# Patient Record
Sex: FEMALE | Race: WHITE | NOT HISPANIC OR LATINO | Employment: OTHER | URBAN - METROPOLITAN AREA
[De-identification: names, ages, dates, MRNs, and addresses within clinical notes are randomized per-mention and may not be internally consistent; named-entity substitution may affect disease eponyms.]

---

## 2017-12-28 ENCOUNTER — APPOINTMENT (OUTPATIENT)
Dept: GENERAL RADIOLOGY | Facility: HOSPITAL | Age: 77
End: 2017-12-28

## 2017-12-28 PROCEDURE — 73130 X-RAY EXAM OF HAND: CPT | Performed by: FAMILY MEDICINE

## 2018-01-02 DIAGNOSIS — S61.451A DOG BITE OF RIGHT HAND, INITIAL ENCOUNTER: ICD-10-CM
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
## 2018-01-02 RX ORDER — AMOXICILLIN AND CLAVULANATE POTASSIUM 875; 125 MG/1; MG/1
TABLET, FILM COATED ORAL
Qty: 10 TABLET | Refills: 0 | OUTPATIENT
Start: 2018-01-02

## 2018-01-02 NOTE — TELEPHONE ENCOUNTER
Patient received refill in Urgent Care on 12/31.  Any further problems/worsening should be evaluated in emergency department.

## 2024-09-09 ENCOUNTER — OFFICE VISIT (OUTPATIENT)
Dept: FAMILY MEDICINE CLINIC | Facility: CLINIC | Age: 84
End: 2024-09-09
Payer: MEDICARE

## 2024-09-09 VITALS
DIASTOLIC BLOOD PRESSURE: 80 MMHG | OXYGEN SATURATION: 98 % | WEIGHT: 170 LBS | HEIGHT: 65 IN | BODY MASS INDEX: 28.32 KG/M2 | SYSTOLIC BLOOD PRESSURE: 120 MMHG | HEART RATE: 71 BPM | TEMPERATURE: 98.2 F

## 2024-09-09 DIAGNOSIS — M25.812 ENLARGEMENT OF LEFT STERNOCLAVICULAR JOINT: ICD-10-CM

## 2024-09-09 DIAGNOSIS — Z00.00 ENCOUNTER FOR MEDICAL EXAMINATION TO ESTABLISH CARE: Primary | ICD-10-CM

## 2024-09-09 DIAGNOSIS — J30.9 CHRONIC ALLERGIC RHINITIS: ICD-10-CM

## 2024-09-09 DIAGNOSIS — E78.1 HYPERTRIGLYCERIDEMIA: ICD-10-CM

## 2024-09-09 DIAGNOSIS — Z86.718 HISTORY OF DVT OF LOWER EXTREMITY: ICD-10-CM

## 2024-09-09 DIAGNOSIS — M21.611 BUNION OF RIGHT FOOT: ICD-10-CM

## 2024-09-09 DIAGNOSIS — R91.8 LUNG NODULES: ICD-10-CM

## 2024-09-09 DIAGNOSIS — E03.9 HYPOTHYROIDISM, ADULT: ICD-10-CM

## 2024-09-09 DIAGNOSIS — Z85.3 HISTORY OF BREAST CANCER: ICD-10-CM

## 2024-09-09 DIAGNOSIS — Z12.11 COLON CANCER SCREENING: ICD-10-CM

## 2024-09-09 DIAGNOSIS — I10 ESSENTIAL HYPERTENSION: ICD-10-CM

## 2024-09-09 PROCEDURE — 1159F MED LIST DOCD IN RCRD: CPT

## 2024-09-09 PROCEDURE — 1160F RVW MEDS BY RX/DR IN RCRD: CPT

## 2024-09-09 PROCEDURE — 99204 OFFICE O/P NEW MOD 45 MIN: CPT

## 2024-09-09 PROCEDURE — 3074F SYST BP LT 130 MM HG: CPT

## 2024-09-09 PROCEDURE — 3079F DIAST BP 80-89 MM HG: CPT

## 2024-09-09 RX ORDER — LEVOTHYROXINE SODIUM 100 UG/1
100 TABLET ORAL DAILY
Qty: 90 TABLET | Refills: 3 | Status: SHIPPED | OUTPATIENT
Start: 2024-09-09 | End: 2025-09-04

## 2024-09-09 RX ORDER — CETIRIZINE HYDROCHLORIDE 10 MG/1
TABLET ORAL DAILY
COMMUNITY
End: 2024-09-09 | Stop reason: SDUPTHER

## 2024-09-09 RX ORDER — ANASTROZOLE 1 MG/1
1 TABLET ORAL DAILY
Qty: 90 TABLET | Refills: 3 | Status: SHIPPED | OUTPATIENT
Start: 2024-09-09 | End: 2025-09-04

## 2024-09-09 RX ORDER — CHLORAL HYDRATE 500 MG
1000 CAPSULE ORAL
Qty: 90 CAPSULE | Refills: 3 | Status: SHIPPED | OUTPATIENT
Start: 2024-09-09 | End: 2025-09-04

## 2024-09-09 RX ORDER — CETIRIZINE HYDROCHLORIDE 10 MG/1
10 TABLET ORAL DAILY
Qty: 90 TABLET | Refills: 3 | Status: SHIPPED | OUTPATIENT
Start: 2024-09-09 | End: 2025-09-04

## 2024-09-09 RX ORDER — LEVOTHYROXINE SODIUM 100 UG/1
100 TABLET ORAL DAILY
COMMUNITY
Start: 2024-05-29 | End: 2024-09-09 | Stop reason: SDUPTHER

## 2024-09-09 RX ORDER — PHENOL 1.4 %
600 AEROSOL, SPRAY (ML) MUCOUS MEMBRANE DAILY
Qty: 90 TABLET | Refills: 3 | Status: SHIPPED | OUTPATIENT
Start: 2024-09-09 | End: 2025-09-04

## 2024-09-09 RX ORDER — ANASTROZOLE 1 MG/1
1 TABLET ORAL DAILY
COMMUNITY
Start: 2024-03-26 | End: 2024-09-09 | Stop reason: SDUPTHER

## 2024-09-09 RX ORDER — ACETAMINOPHEN 500 MG
500 TABLET ORAL AS NEEDED
COMMUNITY

## 2024-09-09 RX ORDER — TURMERIC ROOT EXTRACT 500 MG
TABLET ORAL
COMMUNITY
End: 2024-09-09 | Stop reason: SDUPTHER

## 2024-09-09 RX ORDER — ATENOLOL 25 MG/1
25 TABLET ORAL DAILY
Qty: 90 TABLET | Refills: 3 | Status: SHIPPED | OUTPATIENT
Start: 2024-09-09 | End: 2025-09-04

## 2024-09-09 RX ORDER — PHENOL 1.4 %
600 AEROSOL, SPRAY (ML) MUCOUS MEMBRANE DAILY
COMMUNITY
End: 2024-09-09 | Stop reason: SDUPTHER

## 2024-09-09 RX ORDER — TURMERIC ROOT EXTRACT 500 MG
1 TABLET ORAL DAILY
Qty: 90 TABLET | Refills: 3 | Status: SHIPPED | OUTPATIENT
Start: 2024-09-09 | End: 2025-09-04

## 2024-09-09 NOTE — PROGRESS NOTES
Chief Complaint  Establish Care    Subjective        Mary Barroso presents to [facility] to establish care.    History of Present Illness    Previous PCP trista recinos. Has lived here for about 3 years, from [place]. This is my first time seeing this patient.  Patient's daughter is present today, helped provide history.         Bunion right foot  Recently saw Podiatrist ; had a pedicure and they did not remove an ingrown toe nail correctly on left hallux.  Right hallux, bunion, some skin discoloration.       Htn  Chronic, ongoing, well controlled.   Current medication regimen Atenolol 25mg.  BP Readings from Last 3 Encounters:   09/09/24 120/80   01/15/18 133/85   01/04/18 120/79         Hypothyroidism  Chronic, ongoing, well controlled.   Current medication regimen Synthroid.  03/26/24 tsh 0.529      Hld  03/26/24  Triglycerides  0 - 149 mg/dL 157 High  [facility]      Total Cholesterol  0 - 199 mg/dL 203 High  [facility]      HDL Cholesterol  60 - 9999 mg/dL 51 Low  [facility]   LDL Cholesterol  0 - 100 mg/dL 120 High  [facility]      VLDL Cholesterol  0 - 30 mg/dL 31 High             06/04/21; XR clavicle complete   IMPRESSION: No acute plain radiographic abnormalities. If there is continued clinical concern for pathology at the left sternoclavicular joint, recommend CT imaging.     06/21/21 ct chest  1. Osteoarthritis of the sternoclavicular joints bilaterally, left greater than right. There is a prominent anterior osteophyte projecting from the left clavicular head which could account for enlargement palpated on physical exam.   2. Small bilateral pulmonary nodules, the largest measuring 5 mm. If the patient is at low risk for lung cancer then no routine follow-up will be necessary. If the patient is at high risk for lung cancer, then optional CT at 12 months is recommended as per the  Fleischner Society 2017 guidelines for management of incidentally detected pulmonary nodules in adults.     Patient reports she did not ever have follow up on CT chest.  Past Medical History:   Diagnosis Date    Disease of thyroid gland     History of DVT of lower extremity     Hypertension         Past Surgical History:   Procedure Laterality Date    APPENDECTOMY      HYSTERECTOMY      REPLACEMENT TOTAL KNEE      x 2        Family History   Problem Relation Age of Onset    No Known Problems Mother     No Known Problems Father          PHQ-2 Depression Screening  Little interest or pleasure in doing things? 0-->not at all   Feeling down, depressed, or hopeless? 0-->not at all   PHQ-2 Total Score 0     Social History     Socioeconomic History    Marital status:    Tobacco Use    Smoking status: Never     Passive exposure: Past    Smokeless tobacco: Never   Vaping Use    Vaping status: Never Used   Substance and Sexual Activity    Alcohol use: No    Drug use: No         Health Maintenance  Dentist: every 6 months, last exam 03/24.  Vision; cataracts surgery; last vision exam, 09/24, yearly.  Dexa scan: 12/19/22 IMPRESSION: Average values are within normal ranges with no evidence of osteopenia or osteoporosis.   Pna wait  Covid wait  Flu wait  Awv due 03/26/25  Colonoscopy  01/01/2014; alexandre unsure exact date; denies family history of colon cancer. Agreeable to cologuard, may  not need repeats after this one.    Mammogram 08/24/24  The breasts are almost entirely fatty. There are post-lumpectomy changes in the left breast. In the right breast, no masses, significant calcifications or other abnormalities are seen. Benign calcifications are incidentally identified. Tomosynthesis was utilized for this examination.   IMPRESSION:   There is no mammographic evidence of malignancy. Screening Mammogram in 1 year is recommended.   2019- has taken arimidex for 5 years, last mammogram clear, wanting to known If she  "should stop the arimidex now. Patient has not seen oncologist since relocating to [place], referral placed today.    Plays golf 1-2 twice a week, very active.                                  Objective   Vital Signs:  /80   Pulse 71   Temp 98.2 °F (36.8 °C) (Infrared)   Ht 163.8 cm (64.5\")   Wt 77.1 kg (170 lb)   SpO2 98%   BMI 28.73 kg/m²   Estimated body mass index is 28.73 kg/m² as calculated from the following:    Height as of this encounter: 163.8 cm (64.5\").    Weight as of this encounter: 77.1 kg (170 lb).           Physical Exam  Vitals reviewed.   Constitutional:       General: She is not in acute distress.     Appearance: Normal appearance.   HENT:      Head: Normocephalic and atraumatic.      Right Ear: Tympanic membrane normal.      Left Ear: Tympanic membrane normal.      Nose: Nose normal. No congestion.      Mouth/Throat:      Mouth: Mucous membranes are moist.      Pharynx: No oropharyngeal exudate or posterior oropharyngeal erythema.   Eyes:      Conjunctiva/sclera: Conjunctivae normal.      Pupils: Pupils are equal, round, and reactive to light.   Neck:      Comments: Enlargement left sternoclavicular joint.      Cardiovascular:      Rate and Rhythm: Normal rate and regular rhythm.      Pulses: Normal pulses.      Heart sounds: No murmur heard.     No gallop.   Pulmonary:      Effort: Pulmonary effort is normal. No respiratory distress.      Breath sounds: Normal breath sounds. No wheezing.   Abdominal:      General: Bowel sounds are normal. There is no distension.      Palpations: Abdomen is soft.      Tenderness: There is no abdominal tenderness.   Musculoskeletal:         General: Normal range of motion.      Cervical back: Normal range of motion and neck supple. No tenderness.      Right foot: Bunion present.   Feet:      Right foot:      Toenail Condition: Right toenails are abnormally thick.      Left foot:      Toenail Condition: Left toenails are abnormally thick.   Skin:     " General: Skin is warm and dry.   Neurological:      Mental Status: She is alert and oriented to person, place, and time. Mental status is at baseline.   Psychiatric:         Mood and Affect: Mood normal.        Result Review :  The following data was reviewed by: Val Ivan APRN on 09/09/2024:              Assessment and Plan   Diagnoses and all orders for this visit:    1. Encounter for medical examination to establish care (Primary)    2. Colon cancer screening  -     Cologuard - Stool, Per Rectum; Future    3. Lung nodules  -     CT Chest With Contrast; Future    4. Chronic allergic rhinitis  -     cetirizine (zyrTEC) 10 MG tablet; Take 1 tablet by mouth Daily for 360 days.  Dispense: 90 tablet; Refill: 3    5. Essential hypertension  -     atenolol (TENORMIN) 25 MG tablet; Take 1 tablet by mouth Daily for 360 days.  Dispense: 90 tablet; Refill: 3    6. Hypertriglyceridemia  -     Omega-3 Fatty Acids (fish oil) 1000 MG capsule capsule; Take 1 capsule by mouth Daily With Breakfast for 360 days.  Dispense: 90 capsule; Refill: 3    7. History of DVT of lower extremity    8. Hypothyroidism, adult  -     levothyroxine (SYNTHROID, LEVOTHROID) 100 MCG tablet; Take 1 tablet by mouth Daily for 360 days.  Dispense: 90 tablet; Refill: 3    9. History of breast cancer  -     Ambulatory Referral to Oncology  -     anastrozole (ARIMIDEX) 1 MG tablet; Take 1 tablet by mouth Daily for 360 days.  Dispense: 90 tablet; Refill: 3    10. Enlargement of left sternoclavicular joint  -     CT Chest With Contrast; Future    11. Bunion of right foot    Other orders  -     calcium carbonate (OS-HENRY) 600 MG tablet; Take 1 tablet by mouth Daily for 360 days.  Dispense: 90 tablet; Refill: 3  -     multivitamin with minerals (Multivitamin Adults) tablet tablet; Take 1 tablet by mouth Daily for 360 days.  Dispense: 90 tablet; Refill: 3  -     Turmeric 500 MG tablet; Take 1 tablet by mouth Daily for 360 days.  Dispense: 90 tablet;  Refill: 3    Labs and diagnostic tests reviewed from previous encounters. No lab work needed today.  2019- has taken arimidex for 5 years, last mammogram clear, wanting to known If she should stop the arimidex now. Patient has not seen oncologist since relocating to [place], referral placed today. I was not comfortable with discontinuing the arimidex.   Encourage healthy diet and exercise.  Encourage patient to stay up to date on screening examinations as indicated based on age and risk factors.   F/u with [facility] on 03/26/25.   Follow up with podiatry on bunion of right foot.  Follow up ct ordered for lung nodules.       Follow Up   Return in about 7 months (around 3/26/2025) for Medicare Wellness.  Patient was given instructions and counseling regarding her condition or for health maintenance advice. Please see specific information pulled into the AVS if appropriate.

## 2024-09-30 ENCOUNTER — HOSPITAL ENCOUNTER (OUTPATIENT)
Facility: HOSPITAL | Age: 84
Discharge: HOME | End: 2024-09-30
Payer: MEDICARE

## 2024-09-30 DIAGNOSIS — M25.812 ENLARGEMENT OF LEFT STERNOCLAVICULAR JOINT: ICD-10-CM

## 2024-09-30 DIAGNOSIS — R91.8 LUNG NODULES: ICD-10-CM

## 2024-09-30 PROCEDURE — 71260 CT THORAX DX C+: CPT

## 2024-09-30 PROCEDURE — 25510000001 IOPAMIDOL 61 % SOLUTION

## 2024-09-30 RX ORDER — IOPAMIDOL 612 MG/ML
100 INJECTION, SOLUTION INTRAVASCULAR
Status: COMPLETED | OUTPATIENT
Start: 2024-09-30 | End: 2024-09-30

## 2024-09-30 RX ADMIN — IOPAMIDOL 75 ML: 612 INJECTION, SOLUTION INTRAVENOUS at 08:43

## 2024-10-03 ENCOUNTER — TELEMEDICINE (OUTPATIENT)
Dept: FAMILY MEDICINE CLINIC | Facility: CLINIC | Age: 84
End: 2024-10-03
Payer: MEDICARE

## 2024-10-03 DIAGNOSIS — K86.9 PANCREATIC LESION: ICD-10-CM

## 2024-10-03 DIAGNOSIS — R59.9 ENLARGED LYMPH NODES: ICD-10-CM

## 2024-10-03 DIAGNOSIS — K76.9 HEPATIC LESION: ICD-10-CM

## 2024-10-03 DIAGNOSIS — I71.21 ANEURYSM OF ASCENDING AORTA WITHOUT RUPTURE: ICD-10-CM

## 2024-10-03 DIAGNOSIS — Z85.3 HISTORY OF BREAST CANCER: ICD-10-CM

## 2024-10-03 DIAGNOSIS — N28.9 RENAL LESION: ICD-10-CM

## 2024-10-03 DIAGNOSIS — R91.8 LUNG NODULES: Primary | ICD-10-CM

## 2024-10-03 DIAGNOSIS — M19.012 OSTEOARTHRITIS OF LEFT STERNOCLAVICULAR JOINT: ICD-10-CM

## 2024-10-03 PROCEDURE — 1159F MED LIST DOCD IN RCRD: CPT

## 2024-10-03 PROCEDURE — 99214 OFFICE O/P EST MOD 30 MIN: CPT

## 2024-10-03 PROCEDURE — 1160F RVW MEDS BY RX/DR IN RCRD: CPT

## 2024-10-03 NOTE — PROGRESS NOTES
Subjective          History of Present Illness      This was an audio and video enabled telemedicine encounter.  Patient location: home address as in chart  Physician location: [place] [id number]   Start time: 1231  End time: 1238  Total time of encounter: 7 mins    You have chosen to receive care through a telephone visit. Do you consent to use a telephone visit for your medical care today? Yes    Patient scheduled today to discuss CT of chest findings. CT was ordered to follow up on CT from 2021; 2021 was ordered to check on swelling at left clavicle and lung nodules were found. Patient has history of breast cancer and is currently on Arimidex.      CT chest 09/30/24  FINDINGS:    There is a mildly enlarged 1.3 cm short axis right hilar lymph node and  a prominent 0.9 cm short axis right hilar lymph node.  Heart size is normal. There is no pericardial effusion. There is  incompletely assessed calcific coronary artery atherosclerosis. There is  at least moderate calcific aortic atherosclerosis. The ascending aorta  is dilated to 4.1 cm at the level of the pulmonary trunk. The pleural  spaces are clear.  Limited imaging through the upper abdomen demonstrates a 4.8 cm left  hepatic cyst. A 2.2 cm posterior right hepatic lesion and a small  hypodensity in segment 8 are incompletely characterized but not clearly  cysts. There is a 1.3 cm hypodensity in the pancreatic tail, which is  incompletely assessed. There is a partially imaged at least 4 cm  partially exophytic fluid density right renal lesion, which may reflect  a cyst. There is calcific atherosclerosis. There is osseous  demineralization. There is degenerative disc disease. There is  irregularity and of the left clavicular head with osteophyte formation  and surrounding soft tissue density, which is asymmetric compared to the  right.  The trachea is largely clear. There is no focal consolidation. There is  a 0.6 cm nodule abutting the pleural surface in the posterior  "left lower  lobe (image 66). There is a 0.5 cm nodule abutting the pleural surface  in the posterior right lower lobe (image 54). There is a punctate nodule  in the posterior right upper lobe (image 49).           IMPRESSION:  1.  Incompletely assessed hepatic lesions, as above. Recommend  comparison with prior imaging, if available. Otherwise, recommend  further evaluation with multiphase CT or contrast-enhanced MRI. A  pancreatic tail lesion is also incompletely assessed. Recommend  attention on multiphase CT or MRI.  2.  Prominent and mildly enlarged right hilar lymph nodes are  nonspecific. Recommend comparison with prior imaging, if available.  Otherwise, recommend short-term follow-up contrast-enhanced CT chest in  3 months.  3.  Scattered small pulmonary nodules measuring up to 0.6 cm are  nonspecific. Recommend comparison with prior imaging, if available.  Otherwise, recommend attention on follow-up.  4.  Thoracic aortic aneurysm.  5.  Asymmetric left sternoclavicular osteoarthritis.      Patient has been asymptomatic, she is very active for her age and overall feels fine and is very sick. Reports besides the breast cancer she has been very healthy over the years. Declines abdominal pain, weight loss, NVD, SOB.     Objective   Vital Signs:  There were no vitals taken for this visit.  Estimated body mass index is 28.73 kg/m² as calculated from the following:    Height as of 9/9/24: 163.8 cm (64.5\").    Weight as of 9/9/24: 77.1 kg (170 lb).            Physical Exam  Constitutional:       General: She is not in acute distress.     Appearance: Normal appearance. She is not ill-appearing.   Pulmonary:      Effort: No respiratory distress.   Neurological:      Mental Status: She is oriented to person, place, and time. Mental status is at baseline.   Psychiatric:         Mood and Affect: Mood normal.        Result Review :  The following data was reviewed by: Val Ivan APRN on 10/03/2024:            "   Assessment and Plan   Diagnoses and all orders for this visit:    1. Lung nodules (Primary)  -     Ambulatory Referral to Multi-Disciplinary Clinic  -     CT Chest With Contrast; Future    2. Osteoarthritis of left sternoclavicular joint    3. Hepatic lesion  -     Ambulatory Referral to Gastroenterology  -     CT Abdomen Pelvis With Contrast; Future    4. Pancreatic lesion  -     Ambulatory Referral to Gastroenterology  -     CT Abdomen Pelvis With Contrast; Future    5. Enlarged lymph nodes  -     CT Chest With Contrast; Future    6. Aneurysm of ascending aorta without rupture    7. Renal lesion  -     CT Abdomen Pelvis With Contrast; Future    8. History of breast cancer      Discussed results in depth with patient via telemedicine.   Daughter contacted via telephone and all results were discussed, and all questions were answered.  CT of abdomen ordered STAT to obtain better image of renal lesion, hepatic lesions, and pancreatic lesions. Referral placed to GI STAT. CT of abdomen scheduled for 10/07/24.  CT of chest ordered for 3 month follow up to check on lung nodules and enlarged hilar lymph nodes. Referral placed for lung nodule clinic.  Patient has appt with oncology on 11/19/24 due to history of breast cancer and currently taking arimdex for last 5 years. Patient was wanting to come off medication. Sent CT results to oncology provider she is meeting on 11/19/24, asking for recommendations and If she needed to be seen sooner. Waiting for response.  Will continue to monitor thoracic aneurysm with surveillance imaging.        Follow Up   Return if symptoms worsen or fail to improve.  Patient was given instructions and counseling regarding her condition or for health maintenance advice. Please see specific information pulled into the AVS if appropriate.     Mode of Visit: Video  Location of patient: Home  Location of provider: [facility]  You have chosen to receive care through a telehealth visit.  The  patient has signed the video visit consent form.  The visit included audio and video interaction. No technical issues occurred during this visit.  I spent 30 minutes caring for Mary on this date of service. This time includes time spent by me in the following activities: preparing for the visit, reviewing tests, counseling and educating the patient/family/caregiver, referring and communicating with other health care professionals, documenting information in the medical record, independently interpreting results and communicating that information with the patient/family/caregiver, care coordination, and ordering test(s)

## 2024-10-04 ENCOUNTER — TELEPHONE (OUTPATIENT)
Dept: FAMILY MEDICINE CLINIC | Facility: CLINIC | Age: 84
End: 2024-10-04

## 2024-10-04 NOTE — TELEPHONE ENCOUNTER
Caller: MARINA DONNELLY    Relationship: DAUGHTER     Best call back number: [phone]    What is the best time to reach you: Anytime    Who are you requesting to speak with (clinical staff, provider,  specific staff member): clinical    Do you know the name of the person who called: navneet trotter    What was the call regarding: marina would like to speak to navneet trotter about patients ct scan results. Please call back anytime to discuss.      MARINA DONNELLY IS ON  [employer] BUT NO BOXES ARE CHECKED

## 2024-10-07 ENCOUNTER — OFFICE VISIT (OUTPATIENT)
Dept: FAMILY MEDICINE CLINIC | Facility: CLINIC | Age: 84
End: 2024-10-07
Payer: MEDICARE

## 2024-10-07 ENCOUNTER — HOSPITAL ENCOUNTER (OUTPATIENT)
Dept: CT IMAGING | Facility: HOSPITAL | Age: 84
Discharge: HOME | End: 2024-10-07
Payer: MEDICARE

## 2024-10-07 VITALS
HEIGHT: 64 IN | DIASTOLIC BLOOD PRESSURE: 78 MMHG | BODY MASS INDEX: 29.4 KG/M2 | TEMPERATURE: 97.8 F | OXYGEN SATURATION: 96 % | HEART RATE: 68 BPM | SYSTOLIC BLOOD PRESSURE: 128 MMHG | WEIGHT: 172.2 LBS

## 2024-10-07 DIAGNOSIS — I71.21 ANEURYSM OF ASCENDING AORTA WITHOUT RUPTURE: Primary | ICD-10-CM

## 2024-10-07 DIAGNOSIS — Z23 IMMUNIZATION DUE: Primary | ICD-10-CM

## 2024-10-07 DIAGNOSIS — R13.10 DYSPHAGIA, UNSPECIFIED TYPE: ICD-10-CM

## 2024-10-07 DIAGNOSIS — N28.9 RENAL LESION: ICD-10-CM

## 2024-10-07 DIAGNOSIS — K86.9 PANCREATIC LESION: ICD-10-CM

## 2024-10-07 DIAGNOSIS — K76.9 HEPATIC LESION: ICD-10-CM

## 2024-10-07 LAB — CREAT BLDA-MCNC: 1 MG/DL (ref 0.6–1.3)

## 2024-10-07 PROCEDURE — 82565 ASSAY OF CREATININE: CPT

## 2024-10-07 PROCEDURE — 99213 OFFICE O/P EST LOW 20 MIN: CPT

## 2024-10-07 PROCEDURE — 3078F DIAST BP <80 MM HG: CPT

## 2024-10-07 PROCEDURE — 3074F SYST BP LT 130 MM HG: CPT

## 2024-10-07 PROCEDURE — 74178 CT ABD&PLV WO CNTR FLWD CNTR: CPT

## 2024-10-07 PROCEDURE — 25510000001 IOPAMIDOL 61 % SOLUTION

## 2024-10-07 RX ORDER — IOPAMIDOL 612 MG/ML
100 INJECTION, SOLUTION INTRAVASCULAR
Status: COMPLETED | OUTPATIENT
Start: 2024-10-07 | End: 2024-10-07

## 2024-10-07 RX ADMIN — IOPAMIDOL 85 ML: 612 INJECTION, SOLUTION INTRAVENOUS at 10:31

## 2024-10-07 NOTE — PROGRESS NOTES
"Chief Complaint  ct results    Subjective          History of Present Illness    Patient presents with both daughters today to go over ct results of abdomen.    CT abdomen pelvis   IMPRESSION:  1. Simple appearing cyst seen in segment 3 of the left hepatic lobe with  small amount of adjacent/distal intrahepatic biliary ductal dilatation,  suspect related to mass effect from the cyst.  2. Hypoattenuating lesion seen in segment 6 of the right hepatic lobe,  indeterminate for enhancement. A nonspecific liver lesion. Consider  6-12-month follow-up liver CT or liver MRI to assess the behavior of the  lesion.  3. Small amount of gallbladder wall thickening. If referable symptoms,  gallbladder ultrasound can characterize further.  4. Severe atrophy of the distal pancreatic tail with mild pancreatic  ductal dilatation. No obstructing mass is identified. Finding could be  related to an underlying stricture. Attention on follow-up CT or MRI.  5. Complex lesion in the left mid kidney without enhancement is  consistent with a hemorrhagic or proteinaceous cyst.  6. Colonic diverticulosis.    Dysphagia  Difficulty swallowing.  Ongoing for a long time.  Reports she will even Choke on water sometimes.        Objective   Vital Signs:  /78 (BP Location: Right arm, Patient Position: Sitting, Cuff Size: Adult)   Pulse 68   Temp 97.8 °F (36.6 °C) (Temporal)   Ht 163.8 cm (64.49\")   Wt 78.1 kg (172 lb 3.2 oz)   SpO2 96%   BMI 29.11 kg/m²   Estimated body mass index is 29.11 kg/m² as calculated from the following:    Height as of this encounter: 163.8 cm (64.49\").    Weight as of this encounter: 78.1 kg (172 lb 3.2 oz).            Physical Exam  Vitals reviewed.   Constitutional:       General: She is not in acute distress.     Appearance: Normal appearance.   HENT:      Head: Normocephalic and atraumatic.   Eyes:      Conjunctiva/sclera: Conjunctivae normal.      Pupils: Pupils are equal, round, and reactive to light. "   Cardiovascular:      Rate and Rhythm: Normal rate and regular rhythm.      Pulses: Normal pulses.      Heart sounds: No murmur heard.     No gallop.   Pulmonary:      Effort: Pulmonary effort is normal. No respiratory distress.      Breath sounds: Normal breath sounds. No wheezing.   Abdominal:      General: Bowel sounds are normal. There is no distension.      Palpations: Abdomen is soft.      Tenderness: There is no abdominal tenderness.   Musculoskeletal:         General: Normal range of motion.      Cervical back: Normal range of motion and neck supple. No tenderness.   Skin:     General: Skin is warm and dry.   Neurological:      Mental Status: She is alert and oriented to person, place, and time. Mental status is at baseline.   Psychiatric:         Mood and Affect: Mood normal.        Result Review :                Assessment and Plan   Diagnoses and all orders for this visit:    1. Aneurysm of ascending aorta without rupture (Primary)  -     Ambulatory Referral to Cardiology    2. Renal lesion  -     CT Abdomen With & Without Contrast; Future    3. Hepatic lesion  -     CT Abdomen With & Without Contrast; Future    4. Pancreatic lesion  -     CT Abdomen With & Without Contrast; Future    5. Dysphagia, unspecified type    Spoke with patient, daughters and discussed results in depth. Daughters very concerned of the findings of the ct of chest and abdomen.    Patient has appt with oncology on 11/19/24 due to history of breast cancer and currently taking arimdex for last 5 years. Patient was wanting to come off medication. Sent CT results of chest and abdomen to oncology provider she is meeting on 11/19/24, asking for recommendations and If she needed to be seen sooner. Waiting for response.  Will continue to monitor thoracic aneurysm with surveillance imaging. Patient would like to go ahead and see a cardiologist, referral placed.  Patient scheduled to see GI specialists this week. Encouraged patient to  discuss dysphagia with GI specialist.  Follow up ct of abdomen ordered for 3 months, unless specialists recommend additional imaging.  Referral placed during last visit for the lung nodule clinic.            Follow Up   Return in about 3 months (around 1/7/2025) for Recheck.  Patient was given instructions and counseling regarding her condition or for health maintenance advice. Please see specific information pulled into the AVS if appropriate.

## 2024-10-09 ENCOUNTER — PREP FOR SURGERY (OUTPATIENT)
Dept: SURGERY | Facility: SURGERY CENTER | Age: 84
End: 2024-10-09
Payer: MEDICARE

## 2024-10-09 ENCOUNTER — LAB (OUTPATIENT)
Dept: LAB | Facility: HOSPITAL | Age: 84
End: 2024-10-09
Payer: MEDICARE

## 2024-10-09 ENCOUNTER — OFFICE VISIT (OUTPATIENT)
Dept: GASTROENTEROLOGY | Facility: CLINIC | Age: 84
End: 2024-10-09
Payer: MEDICARE

## 2024-10-09 VITALS
BODY MASS INDEX: 29.12 KG/M2 | OXYGEN SATURATION: 94 % | TEMPERATURE: 96.1 F | SYSTOLIC BLOOD PRESSURE: 110 MMHG | DIASTOLIC BLOOD PRESSURE: 70 MMHG | HEART RATE: 87 BPM | HEIGHT: 64 IN | WEIGHT: 170.6 LBS

## 2024-10-09 DIAGNOSIS — K76.9 LIVER LESION: Primary | ICD-10-CM

## 2024-10-09 DIAGNOSIS — Q45.3 ABNORMALITY OF PANCREATIC DUCT: ICD-10-CM

## 2024-10-09 DIAGNOSIS — D37.6 NEOPLASM OF UNCERTAIN BEHAVIOR OF LIVER, GALLBLADDER AND BILE DUCTS: ICD-10-CM

## 2024-10-09 DIAGNOSIS — R13.19 ESOPHAGEAL DYSPHAGIA: ICD-10-CM

## 2024-10-09 DIAGNOSIS — R13.19 ESOPHAGEAL DYSPHAGIA: Primary | ICD-10-CM

## 2024-10-09 LAB
ALBUMIN SERPL-MCNC: 3.9 G/DL (ref 3.5–5.2)
ALP SERPL-CCNC: 85 U/L (ref 39–117)
ALPHA-FETOPROTEIN: 4.19 NG/ML (ref 0–8.3)
ALT SERPL W P-5'-P-CCNC: 14 U/L (ref 1–33)
AST SERPL-CCNC: 21 U/L (ref 1–32)
BILIRUB CONJ SERPL-MCNC: <0.2 MG/DL (ref 0–0.3)
BILIRUB INDIRECT SERPL-MCNC: NORMAL MG/DL
BILIRUB SERPL-MCNC: 0.5 MG/DL (ref 0–1.2)
CANCER AG19-9 SERPL-ACNC: 9.1 U/ML
PROT SERPL-MCNC: 7 G/DL (ref 6–8.5)

## 2024-10-09 PROCEDURE — 1159F MED LIST DOCD IN RCRD: CPT | Performed by: NURSE PRACTITIONER

## 2024-10-09 PROCEDURE — 3074F SYST BP LT 130 MM HG: CPT | Performed by: NURSE PRACTITIONER

## 2024-10-09 PROCEDURE — 36415 COLL VENOUS BLD VENIPUNCTURE: CPT | Performed by: NURSE PRACTITIONER

## 2024-10-09 PROCEDURE — 80076 HEPATIC FUNCTION PANEL: CPT | Performed by: NURSE PRACTITIONER

## 2024-10-09 PROCEDURE — 3078F DIAST BP <80 MM HG: CPT | Performed by: NURSE PRACTITIONER

## 2024-10-09 PROCEDURE — 82105 ALPHA-FETOPROTEIN SERUM: CPT | Performed by: NURSE PRACTITIONER

## 2024-10-09 PROCEDURE — 86301 IMMUNOASSAY TUMOR CA 19-9: CPT | Performed by: NURSE PRACTITIONER

## 2024-10-09 PROCEDURE — 99214 OFFICE O/P EST MOD 30 MIN: CPT | Performed by: NURSE PRACTITIONER

## 2024-10-09 PROCEDURE — 1160F RVW MEDS BY RX/DR IN RCRD: CPT | Performed by: NURSE PRACTITIONER

## 2024-10-09 RX ORDER — SODIUM CHLORIDE, SODIUM LACTATE, POTASSIUM CHLORIDE, CALCIUM CHLORIDE 600; 310; 30; 20 MG/100ML; MG/100ML; MG/100ML; MG/100ML
30 INJECTION, SOLUTION INTRAVENOUS CONTINUOUS PRN
OUTPATIENT
Start: 2024-10-09 | End: 2024-10-11

## 2024-10-09 RX ORDER — SODIUM CHLORIDE 0.9 % (FLUSH) 0.9 %
3 SYRINGE (ML) INJECTION EVERY 12 HOURS SCHEDULED
OUTPATIENT
Start: 2024-10-09

## 2024-10-09 RX ORDER — SODIUM CHLORIDE 0.9 % (FLUSH) 0.9 %
10 SYRINGE (ML) INJECTION AS NEEDED
OUTPATIENT
Start: 2024-10-09

## 2024-10-09 NOTE — PROGRESS NOTES
Chief Complaint   Patient presents with    GI Problem         History of Present Illness  Patient is an 84-year-old female who presents today for evaluation.  She was referred for dysphagia and hepatic, pancreatic, and renal lesions.  She had a CT scan of the abdomen performed earlier this week that showed a hepatic cyst, an indeterminant liver lesion, small amount of gallbladder wall thickening, severe atrophy of the pancreatic tail with mild pancreatic ductal dilatation, no obstructing mass was seen.  There was also a complex lesion in the left kidney felt to be a hemorrhagic or proteinaceous cyst.  She had previously had a CT scan of the chest performed that showed a 1.3 cm hypodensity in the pancreatic tail that was incompletely assessed.  No mass seen on follow-up abdominal CT.  Cologuard September 2024 was negative.    Pancreatic patient presents today for evaluation.  She denies any history of any pancreatic or liver disorders.  CT findings were incidental.    She denies any significant abdominal pain.  Occasionally notes abdominal pain prior to having a bowel movement that will resolve after a bowel movement.  Takes Metamucil to help regulate bowels and is having regular stools.  She denies any blood in the stool or dark stool.    She denies any nausea or vomiting.    She does report she has been experiencing issues with dysphagia.  This comes and goes.  When it occurs, she feels the food gets stuck in her mid chest.  It will eventually pass down.  She does not have to forcefully regurgitate it.  It has occurred with solids and liquids.     Result Review :       CT Chest With Contrast Diagnostic (09/30/2024 08:43)    Office Visit with Val Ivan APRN (10/07/2024)    Cologuard - Stool, Per Rectum (09/24/2024 10:05)    CT Abdomen Pelvis With & Without Contrast (10/07/2024 10:37)    Referral to Gastroenterology for Hepatic lesion; Pancreatic lesion; Renal lesion; Dysphagia (10/03/2024)    Vital  "Signs:   /70   Pulse 87   Temp 96.1 °F (35.6 °C)   Ht 163.8 cm (64.49\")   Wt 77.4 kg (170 lb 9.6 oz)   SpO2 94%   BMI 28.84 kg/m²     Body mass index is 28.84 kg/m².     Physical Exam  Vitals reviewed.   Constitutional:       General: She is not in acute distress.     Appearance: She is well-developed.   HENT:      Head: Normocephalic and atraumatic.   Pulmonary:      Effort: Pulmonary effort is normal. No respiratory distress.   Abdominal:      General: Abdomen is flat. Bowel sounds are normal. There is no distension.      Palpations: Abdomen is soft.      Tenderness: There is no abdominal tenderness.   Skin:     General: Skin is dry.      Coloration: Skin is not pale.   Neurological:      Mental Status: She is alert and oriented to person, place, and time.   Psychiatric:         Thought Content: Thought content normal.           Assessment and Plan    Diagnoses and all orders for this visit:    1. Liver lesion (Primary)  -     Hepatic Function Panel  -     AFP Tumor Marker  -     MRI abdomen w wo contrast mrcp; Future    2. Abnormality of pancreatic duct  -     Cancer Antigen 19-9  -     MRI abdomen w wo contrast mrcp; Future    3. Esophageal dysphagia    4. Neoplasm of uncertain behavior of liver, gallbladder and bile ducts  -     Cancer Antigen 19-9  -     AFP Tumor Marker         Discussion  Patient presents today for evaluation after CT scan showed liver lesions, pancreatic ductal dilatation, and pancreatic atrophy.  No pancreatic mass seen on abdominal CT.  We will check alpha-fetoprotein level, CA 19-9 level, and hepatic function panel today.  As long as labs are normal, discussed recommendation for MRI with MRCP to be done in around 6 months for monitoring and further characterization of findings.  She is currently asymptomatic and has no history of pancreatitis.    Regarding dysphagia, recommended EGD for further evaluation and to assess for any evidence of esophagitis, esophageal stricture, " esophageal infection, or hiatal hernia that could be contributing to symptoms.  If stricture or ring is identified on EGD will plan on dilation at time of EGD to address this.          Follow Up   Return for Follow up to review results after testing complete.    Patient Instructions   Schedule EGD for further evaluation of symptoms.     Check lab work today.    As long as labs are normal, we will plan for MRI with MRCP to be done in 6 months for monitoring. If labs abnormal, will plan to schedule MRI now.    Call for any new or worsening symptoms.

## 2024-10-09 NOTE — PATIENT INSTRUCTIONS
Schedule EGD for further evaluation of symptoms.     Check lab work today.    As long as labs are normal, we will plan for MRI with MRCP to be done in 6 months for monitoring. If labs abnormal, will plan to schedule MRI now.    Call for any new or worsening symptoms.

## 2024-10-11 ENCOUNTER — TELEPHONE (OUTPATIENT)
Dept: GASTROENTEROLOGY | Facility: CLINIC | Age: 84
End: 2024-10-11
Payer: MEDICARE

## 2024-10-11 NOTE — TELEPHONE ENCOUNTER
"Patient left a Voice Message stating that she had questions about \"appointment of Monday\". Requested to talk to Cesia Griffin.    I called patient back, she replied : \" I cannot talk, I am in the car right now\" and hung up.   "

## 2024-10-18 ENCOUNTER — OFFICE VISIT (OUTPATIENT)
Age: 84
End: 2024-10-18
Payer: MEDICARE

## 2024-10-18 VITALS
HEART RATE: 63 BPM | BODY MASS INDEX: 29.64 KG/M2 | WEIGHT: 173.6 LBS | DIASTOLIC BLOOD PRESSURE: 72 MMHG | HEIGHT: 64 IN | SYSTOLIC BLOOD PRESSURE: 110 MMHG

## 2024-10-18 DIAGNOSIS — I71.21 ANEURYSM OF ASCENDING AORTA WITHOUT RUPTURE: Primary | ICD-10-CM

## 2024-10-18 PROCEDURE — 93000 ELECTROCARDIOGRAM COMPLETE: CPT | Performed by: INTERNAL MEDICINE

## 2024-10-18 PROCEDURE — 3078F DIAST BP <80 MM HG: CPT | Performed by: INTERNAL MEDICINE

## 2024-10-18 PROCEDURE — 3074F SYST BP LT 130 MM HG: CPT | Performed by: INTERNAL MEDICINE

## 2024-10-18 PROCEDURE — 99204 OFFICE O/P NEW MOD 45 MIN: CPT | Performed by: INTERNAL MEDICINE

## 2024-10-18 NOTE — PROGRESS NOTES
Subjective:     Encounter Date:10/18/2024      Patient ID: Mary Barroso is a 84 y.o. female.    Chief Complaint: aortic aneurysm  HPI:   This is a very pleasant 84-year-old woman who presents for evaluation.  She recently had a CT chest performed due to a prominent sternoclavicular joint and history of lung nodules.  It resulted in a number of abnormalities including a mildly dilated ascending aorta 4.1 at the level of the pulmonary trunk, 4.8 cm hepatic cyst and hypodensity, 1.3 cm in the pancreatic tail as well as a 4 cm right renal lesion which could be cystic.  The patient has no history of cardiac disease.  She feels well without angina or dyspnea.  She has no history of AAA.  No family history of aneurysm or connective tissue disease.  She is treated with atenolol for hypertension.     The following portions of the patient's history were reviewed and updated as appropriate: allergies, current medications, past family history, past medical history, past social history, past surgical history and problem list.     REVIEW OF SYSTEMS:   All systems reviewed.  Pertinent positives identified in HPI.  All other systems are negative.    Past Medical History:   Diagnosis Date    Disease of thyroid gland     History of DVT of lower extremity     Hypertension        Family History   Problem Relation Age of Onset    Stroke Mother     No Known Problems Father     Cancer Sister     Colon cancer Neg Hx     Colon polyps Neg Hx     Crohn's disease Neg Hx     Irritable bowel syndrome Neg Hx     Ulcerative colitis Neg Hx        Social History     Socioeconomic History    Marital status:    Tobacco Use    Smoking status: Never     Passive exposure: Past    Smokeless tobacco: Never    Tobacco comments:     Caffeine use    Vaping Use    Vaping status: Never Used   Substance and Sexual Activity    Alcohol use: No    Drug use: No    Sexual activity: Defer       Allergies   Allergen Reactions    Daypro [Oxaprozin]      Meperidine Other (See Comments)     Not sure reaction    Sulfa Antibiotics     Vicodin [Hydrocodone-Acetaminophen]        Past Surgical History:   Procedure Laterality Date    APPENDECTOMY      HYSTERECTOMY      REPLACEMENT TOTAL KNEE      x 2         ECG 12 Lead    Date/Time: 10/18/2024 2:43 PM  Performed by: Angie Gallegos MD    Authorized by: Angie Gallegos MD  Comparison: not compared with previous ECG   Rhythm: sinus rhythm  Rate: normal  Conduction: conduction normal  ST Segments: ST segments normal  T Waves: T waves normal  QRS axis: normal  Other: no other findings    Clinical impression: normal ECG             Objective:         PHYSICAL EXAM:  GEN: VSS, no distress,   Eyes: normal sclera, normal lids and lashes  HENT: moist mucus membranes,   Respiratory: CTAB, no rales or wheezes  CV: RRR, no murmurs, , +2 DP and 2+ carotid pulses b/l  GI: NABS, soft,  Nontender, nondistended  MSK: no edema, no scoliosis or kyphosis  Skin: no rash, warm, dry  Heme/Lymph: no bruising or bleeding  Psych: organized thought, normal behavior and affect  Neuro: Cranial nerves grossly intact, Alert and Oriented x 3.         Assessment:         No diagnosis found.       Plan:       1.  Ascending thoracic aortic aneurysm: Indexed for her body surface area she is in the low risk category.  She will be having a number of upcoming CTs of the abdomen as well as an MRI to assess the other findings in her liver, pancreas and kidney.  Will be able to assess her abdominal aorta at that time.  She will also need repeat chest CT of the lung nodules in 3 months.  That will also give us an indication of there is been any growth in her aortic size.  If not, I would recommend repeat CT a year from that time.  Her daughter will message me at the time of the repeat CT in 3 months and we can review the findings at that time over the phone.  This is most likely degenerative, we discussed avoiding lifting heavy weights, Valsalva maneuver, and making  sure her blood pressure is well-controlled on atenolol, as it is today.    Val, thank you very much for referring this kind patient to me. Please call me with any questions or concerns. I will see the patient again in the office in 1 year.        Angie Gallegos MD  10/18/24  [facility]    Outpatient Encounter Medications as of 10/18/2024   Medication Sig Dispense Refill    acetaminophen (TYLENOL) 500 MG tablet Take 1 tablet by mouth As Needed for Mild Pain.      anastrozole (ARIMIDEX) 1 MG tablet Take 1 tablet by mouth Daily for 360 days. 90 tablet 3    atenolol (TENORMIN) 25 MG tablet Take 1 tablet by mouth Daily for 360 days. (Patient taking differently: Take 1 tablet by mouth Every Night.) 90 tablet 3    calcium carbonate (OS-HENRY) 600 MG tablet Take 1 tablet by mouth Daily for 360 days. 90 tablet 3    cetirizine (zyrTEC) 10 MG tablet Take 1 tablet by mouth Daily for 360 days. 90 tablet 3    levothyroxine (SYNTHROID, LEVOTHROID) 100 MCG tablet Take 1 tablet by mouth Daily for 360 days. 90 tablet 3    multivitamin with minerals (Multivitamin Adults) tablet tablet Take 1 tablet by mouth Daily for 360 days. 90 tablet 3    Omega-3 Fatty Acids (fish oil) 1000 MG capsule capsule Take 1 capsule by mouth Daily With Breakfast for 360 days. 90 capsule 3    Turmeric 500 MG tablet Take 1 tablet by mouth Daily for 360 days. 90 tablet 3     No facility-administered encounter medications on file as of 10/18/2024.

## 2024-10-29 ENCOUNTER — TRANSCRIBE ORDERS (OUTPATIENT)
Dept: ADMINISTRATIVE | Facility: HOSPITAL | Age: 84
End: 2024-10-29
Payer: MEDICARE

## 2024-10-29 DIAGNOSIS — R91.8 PULMONARY NODULES: Primary | ICD-10-CM

## 2024-10-29 DIAGNOSIS — R91.1 PULMONARY NODULE: ICD-10-CM

## 2024-11-04 ENCOUNTER — CLINICAL SUPPORT (OUTPATIENT)
Dept: FAMILY MEDICINE CLINIC | Facility: CLINIC | Age: 84
End: 2024-11-04
Payer: MEDICARE

## 2024-11-04 DIAGNOSIS — Z23 IMMUNIZATION DUE: Primary | ICD-10-CM

## 2024-11-04 PROCEDURE — 90662 IIV NO PRSV INCREASED AG IM: CPT

## 2024-11-04 PROCEDURE — G0008 ADMIN INFLUENZA VIRUS VAC: HCPCS

## 2024-11-18 ENCOUNTER — TELEPHONE (OUTPATIENT)
Dept: FAMILY MEDICINE CLINIC | Facility: CLINIC | Age: 84
End: 2024-11-18
Payer: MEDICARE

## 2024-11-18 NOTE — PROGRESS NOTES
Appointment (New oncology)        REASON FOR CONSULTATION:     Left breast DCIS                           REQUESTING PHYSICIAN: RIP Morillo*  RECORDS OBTAINED:  Records of the patients history including those from the electronic medical record were reviewed and summarized in detail.    HISTORY OF PRESENT ILLNESS:  The patient is a 84 y.o. 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  Did not require adjuvant radiation.  She was started on Arimidex by her surgeon.  She eventually moved to [place], was on Arimidex up until now, referred by her primary care.  Tolerated it quite well.  She has now completed 5 years of Arimidex, and as such we discussed discontinuation.  She has been getting yearly mammograms, last one was in August 2024 she also had had a bone density scan in December 2022 which was normal.  She is here accompanied by her daughter.  She reports she is following GI, cardiology as well as pulmonology for diabetes abnormality seen on her most recent scans.  She denies any concerns or complaints    Past Medical History:   Diagnosis Date    Disease of thyroid gland     History of DVT of lower extremity     Hypertension      Past Surgical History:   Procedure Laterality Date    APPENDECTOMY      HYSTERECTOMY      REPLACEMENT TOTAL KNEE      x 2       MEDICATIONS    Current Outpatient Medications:     acetaminophen (TYLENOL) 500 MG tablet, Take 1 tablet by mouth As Needed for Mild Pain., Disp: , Rfl:     atenolol (TENORMIN) 25 MG tablet, Take 1 tablet by mouth Daily for 360 days. (Patient taking differently: Take 1 tablet by mouth Every Night.), Disp: 90 tablet, Rfl: 3    calcium carbonate (OS-HENRY) 600 MG tablet, Take 1 tablet by mouth Daily for 360 days., Disp: 90 tablet, Rfl: 3    cetirizine (zyrTEC) 10 MG tablet, Take 1 tablet by mouth Daily for 360 days., Disp: 90 tablet, Rfl: 3    levothyroxine (SYNTHROID, LEVOTHROID)  "100 MCG tablet, Take 1 tablet by mouth Daily for 360 days., Disp: 90 tablet, Rfl: 3    multivitamin with minerals (Multivitamin Adults) tablet tablet, Take 1 tablet by mouth Daily for 360 days., Disp: 90 tablet, Rfl: 3    Omega-3 Fatty Acids (fish oil) 1000 MG capsule capsule, Take 1 capsule by mouth Daily With Breakfast for 360 days., Disp: 90 capsule, Rfl: 3    Turmeric 500 MG tablet, Take 1 tablet by mouth Daily for 360 days., Disp: 90 tablet, Rfl: 3    ALLERGIES:     Allergies   Allergen Reactions    Daypro [Oxaprozin]     Meperidine Other (See Comments)     Not sure reaction    Sulfa Antibiotics     Vicodin [Hydrocodone-Acetaminophen]        SOCIAL HISTORY:       Social History     Socioeconomic History    Marital status:    Tobacco Use    Smoking status: Never     Passive exposure: Past    Smokeless tobacco: Never    Tobacco comments:     Caffeine use    Vaping Use    Vaping status: Never Used   Substance and Sexual Activity    Alcohol use: No    Drug use: No    Sexual activity: Defer         FAMILY HISTORY:  Family History   Problem Relation Age of Onset    Stroke Mother     No Known Problems Father     Cancer Sister     Colon cancer Neg Hx     Colon polyps Neg Hx     Crohn's disease Neg Hx     Irritable bowel syndrome Neg Hx     Ulcerative colitis Neg Hx        REVIEW OF SYSTEMS:  Review of Systems   Constitutional: Negative.    Respiratory: Negative.     Cardiovascular: Negative.    Gastrointestinal: Negative.    Hematological: Negative.               Vitals:    11/19/24 1111   BP: 114/76   Pulse: 66   Resp: 16   Temp: 97.8 °F (36.6 °C)   TempSrc: Oral   SpO2: 98%   Weight: 77.5 kg (170 lb 14.4 oz)   Height: 163.8 cm (64.49\")   PainSc: 0-No pain         11/19/2024    11:08 AM   Current Status   ECOG score 0        PHYSICAL EXAM:    Physical Exam  Constitutional:       Appearance: Normal appearance.   HENT:      Head: Normocephalic and atraumatic.      Mouth/Throat:      Mouth: Mucous membranes are " moist.      Pharynx: Oropharynx is clear.   Eyes:      Extraocular Movements: Extraocular movements intact.      Pupils: Pupils are equal, round, and reactive to light.   Cardiovascular:      Rate and Rhythm: Normal rate and regular rhythm.   Pulmonary:      Effort: Pulmonary effort is normal.      Breath sounds: Normal breath sounds.   Chest:       Abdominal:      General: Bowel sounds are normal.      Palpations: Abdomen is soft.   Musculoskeletal:      Cervical back: Normal range of motion and neck supple.   Neurological:      General: No focal deficit present.      Mental Status: She is alert and oriented to person, place, and time.   Psychiatric:         Mood and Affect: Mood normal.         Behavior: Behavior normal.           RECENT LABS:        WBC   Date Value Ref Range Status   11/19/2024 5.70 3.40 - 10.80 10*3/mm3 Final   03/26/2024 6.10 4.5 - 11.0 10*3/uL Final   09/26/2023 6.76 4.5 - 11.0 10*3/uL Final   03/16/2023 5.27 4.5 - 11.0 10*3/uL Final   09/16/2022 5.24 4.5 - 11.0 10*3/uL Final   03/17/2021 5.12 4.5 - 11.0 10*3/uL Final     Hemoglobin   Date Value Ref Range Status   11/19/2024 13.7 12.0 - 15.9 g/dL Final   03/26/2024 13.1 12.0 - 16.0 g/dL Final   09/26/2023 12.7 12.0 - 16.0 g/dL Final   03/16/2023 13.4 12.0 - 16.0 g/dL Final   09/16/2022 13.7 12.0 - 16.0 g/dL Final   03/17/2021 13.8 12.0 - 16.0 g/dL Final     Platelets   Date Value Ref Range Status   11/19/2024 148 140 - 450 10*3/mm3 Final   03/26/2024 226 140 - 440 10*3/uL Final   09/26/2023 222 140 - 440 10*3/uL Final   03/16/2023 218 140 - 440 10*3/uL Final   09/16/2022 224 140 - 440 10*3/uL Final   03/17/2021 218 140 - 440 10*3/uL Final       Pathology:  Narrative  Performed by COP[facility]       [facility]       [facility] in Laboratory Medicine       [address] [place] [address]       Tel: [phone]         Fax: [phone]         Surgical Pathology Consultation        ADDENDUM ID    Patient Name: VIDYA  "VICKY TRIPATHI : 1940 (Age: 79) Gender: F Taken: 2019 Reported: 2019 Physician(s): Luisana Henry MD ([phone]) Copy To: Luisana Henry MD Accession #: [id number] [facility] Rec. #: [id number] Acct: # [id number]  Final Pathologic Diagnosis  1. Left breast, lumpectomy:       Ductal carcinoma in situ (DCIS), intermediate nuclear grade, solid and cribriform pattern with Central necrosis, adjacent to previous procedure site changes.       The DCIS present in 4 of 22 sections.       Surgical margins, free of tumor cells.       ER/MA pending        Comment: Immunohistochemical stains are performed on 1F and demonstrate that there are CK5/6 positive myoepithelial cells present at the periphery of the DCIS. AE1/AE3 stain is negative in the previous procedure site changes. Controls are adequate.    2. Left breast, additional medial margin, excision:       Breast tissue, no tumor present.    Cancer Case Summary    Procedure  ___ Excision (less than total mastectomy)    Specimen Laterality  ___ Left    Size (Extent) of DCIS (Note C)  Estimated size (extent) of DCIS (greatest dimension using gross and microscopic evaluation): at least (millimeters) 15 mm  + Number of blocks with DCIS: 4  + Number of blocks examined: 22    Histologic Type  ___ Ductal carcinoma in situ    + Architectural Patterns (select all that apply)  + ___ Comedo  + ___ Cribriform  + ___ Solid    Nuclear Grade  ___ Grade II (intermediate)    Necrosis  ___ Present, central (expansive \"comedo\" necrosis)    Margins (select all that apply)  ___ Uninvolved by DCIS       Distance from closest margin (millimeters):7 mm  Specify closest margin: posterior        Regional Lymph Nodes    ___ No lymph nodes submitted or found    Pathologic Stage Classification (pTNM, AJCC 8th Edition)  Primary Tumor (pT)  ___ pTis (DCIS):     Ductal carcinoma in situ         Category (pN)       ___ pNX:     Regional lymph nodes cannot be assessed (eg, not removed for " pathological study or previously removed)               **Report Electronically Signed Out**  rg/2019 Cisco Rodriguez MD    ______________________________________________________________________________    Addendum (PHS)               Date Reported:  2019                Breast Biomarker Reporting Template  Ductal Carcinoma In Situ    Estrogen Receptor (ER)  Positive (percentage of cells with nuclear positivity: 3%); Average intensity of staining: Weak    Internal controls present and stain as expected    Progesterone Receptor (PgR)  Negative    Internal controls present and stain as expected    Imagin2024 Mammo  IMPRESSION:   There is no mammographic evidence of malignancy.     Screening Mammogram in 1 year is recommended.     BI-RADS Category 2:   Benign Finding(s)     Assessment:  *Left breast DCIS ER weakly positive, CA negative  2019-status post left breast lumpectomy (Dr Luisana Henry).  Final path-DCIS, intermediate nuclear grade, solid and cribriform pattern with central necrosis.  Margins negative.  ER 3%, CA negative.  DCISionRT: 0.8, low risk - XRT not needed.  2019- 2024: Arimidex  Aug 2024 Mammo - BiRADS 2.  2024: Patient establish care with me.  She is here accompanied by her daughter.  She has completed 5 years of Arimidex for her left breast DCIS.  As such we we will discontinue Arimidex.  Reviewed her mammogram from 2024.  She will continue to get annual mammograms, ordered by her primary care.    *Variant unknown significance in [place]  2020-CancerNext testing through [employer] for 36 genes, VUS in [place].  She has seen by Sharm[facility]ubias[facility]      *[facility]  2022 DEXA scan - WNL      Plan  Reviewed pathology, imaging, diagnosis and management with the patient and her daughter  Will discontinue Arimidex given she has completed 5 years of chemoprevention  She will be getting annual  mammograms through her primary care  She will return to clinic on an as-needed basis      I spent 50 total minutes, face-to-face, caring for Mary today. Greater than 50% of this time involved counseling and/or coordination of care as documented within this note.

## 2024-11-18 NOTE — TELEPHONE ENCOUNTER
Caller: Mary Barroso    Relationship to patient: Self    Best call back number: [phone]     Patient is needing: PATIENT WOULD LIKE TO KNOW WHY SHE IS RECEIVING CALLS AND LETTERS FROM [facility] ABOUT SETTING UP A CARDIOLOGY DOCTOR EVEN THOUGH SHE ALREADY SEES SOMEONE THROUGH [facility]. WAS THIS A REFERRAL SENT BY KRISHNA POPE?    PLEASE CALL PATIENT BACK TO ADVISE

## 2024-11-19 ENCOUNTER — LAB (OUTPATIENT)
Dept: LAB | Facility: HOSPITAL | Age: 84
End: 2024-11-19
Payer: MEDICARE

## 2024-11-19 ENCOUNTER — CONSULT (OUTPATIENT)
Dept: ONCOLOGY | Facility: CLINIC | Age: 84
End: 2024-11-19
Payer: MEDICARE

## 2024-11-19 VITALS
DIASTOLIC BLOOD PRESSURE: 76 MMHG | RESPIRATION RATE: 16 BRPM | HEIGHT: 64 IN | OXYGEN SATURATION: 98 % | BODY MASS INDEX: 29.18 KG/M2 | SYSTOLIC BLOOD PRESSURE: 114 MMHG | WEIGHT: 170.9 LBS | HEART RATE: 66 BPM | TEMPERATURE: 97.8 F

## 2024-11-19 DIAGNOSIS — Z85.3 HISTORY OF BREAST CANCER: Primary | ICD-10-CM

## 2024-11-19 DIAGNOSIS — D05.12 BREAST NEOPLASM, TIS (DCIS), LEFT: Primary | ICD-10-CM

## 2024-11-19 LAB
BASOPHILS # BLD AUTO: 0.07 10*3/MM3 (ref 0–0.2)
BASOPHILS NFR BLD AUTO: 1.2 % (ref 0–1.5)
DEPRECATED RDW RBC AUTO: 43.6 FL (ref 37–54)
EOSINOPHIL # BLD AUTO: 0.18 10*3/MM3 (ref 0–0.4)
EOSINOPHIL NFR BLD AUTO: 3.2 % (ref 0.3–6.2)
ERYTHROCYTE [DISTWIDTH] IN BLOOD BY AUTOMATED COUNT: 12.6 % (ref 12.3–15.4)
HCT VFR BLD AUTO: 41.4 % (ref 34–46.6)
HGB BLD-MCNC: 13.7 G/DL (ref 12–15.9)
IMM GRANULOCYTES # BLD AUTO: 0.03 10*3/MM3 (ref 0–0.05)
IMM GRANULOCYTES NFR BLD AUTO: 0.5 % (ref 0–0.5)
LYMPHOCYTES # BLD AUTO: 2.29 10*3/MM3 (ref 0.7–3.1)
LYMPHOCYTES NFR BLD AUTO: 40.2 % (ref 19.6–45.3)
MCH RBC QN AUTO: 31.3 PG (ref 26.6–33)
MCHC RBC AUTO-ENTMCNC: 33.1 G/DL (ref 31.5–35.7)
MCV RBC AUTO: 94.5 FL (ref 79–97)
MONOCYTES # BLD AUTO: 0.47 10*3/MM3 (ref 0.1–0.9)
MONOCYTES NFR BLD AUTO: 8.2 % (ref 5–12)
NEUTROPHILS NFR BLD AUTO: 2.66 10*3/MM3 (ref 1.7–7)
NEUTROPHILS NFR BLD AUTO: 46.7 % (ref 42.7–76)
NRBC BLD AUTO-RTO: 0 /100 WBC (ref 0–0.2)
PLATELET # BLD AUTO: 148 10*3/MM3 (ref 140–450)
PMV BLD AUTO: 10.7 FL (ref 6–12)
RBC # BLD AUTO: 4.38 10*6/MM3 (ref 3.77–5.28)
WBC NRBC COR # BLD AUTO: 5.7 10*3/MM3 (ref 3.4–10.8)

## 2024-11-19 PROCEDURE — 85025 COMPLETE CBC W/AUTO DIFF WBC: CPT

## 2024-11-19 PROCEDURE — 36415 COLL VENOUS BLD VENIPUNCTURE: CPT

## 2024-12-03 ENCOUNTER — TELEPHONE (OUTPATIENT)
Dept: GASTROENTEROLOGY | Facility: CLINIC | Age: 84
End: 2024-12-03
Payer: MEDICARE

## 2024-12-05 ENCOUNTER — ANESTHESIA (OUTPATIENT)
Dept: SURGERY | Facility: SURGERY CENTER | Age: 84
End: 2024-12-05
Payer: MEDICARE

## 2024-12-05 ENCOUNTER — ANESTHESIA EVENT (OUTPATIENT)
Dept: SURGERY | Facility: SURGERY CENTER | Age: 84
End: 2024-12-05
Payer: MEDICARE

## 2024-12-05 ENCOUNTER — HOSPITAL ENCOUNTER (OUTPATIENT)
Facility: SURGERY CENTER | Age: 84
Setting detail: HOSPITAL OUTPATIENT SURGERY
Discharge: HOME | End: 2024-12-05
Attending: INTERNAL MEDICINE | Admitting: INTERNAL MEDICINE
Payer: MEDICARE

## 2024-12-05 VITALS
WEIGHT: 171 LBS | HEIGHT: 65 IN | RESPIRATION RATE: 18 BRPM | OXYGEN SATURATION: 96 % | TEMPERATURE: 98 F | HEART RATE: 59 BPM | SYSTOLIC BLOOD PRESSURE: 126 MMHG | DIASTOLIC BLOOD PRESSURE: 66 MMHG | BODY MASS INDEX: 28.49 KG/M2

## 2024-12-05 DIAGNOSIS — R13.19 ESOPHAGEAL DYSPHAGIA: ICD-10-CM

## 2024-12-05 PROCEDURE — 25010000002 PROPOFOL 1000 MG/100ML EMULSION: Performed by: ANESTHESIOLOGY

## 2024-12-05 PROCEDURE — 43249 ESOPH EGD DILATION <30 MM: CPT | Performed by: INTERNAL MEDICINE

## 2024-12-05 PROCEDURE — C1726 CATH, BAL DIL, NON-VASCULAR: HCPCS | Performed by: INTERNAL MEDICINE

## 2024-12-05 PROCEDURE — 25010000002 LIDOCAINE 2% SOLUTION: Performed by: ANESTHESIOLOGY

## 2024-12-05 PROCEDURE — 25010000002 PROPOFOL 10 MG/ML EMULSION: Performed by: ANESTHESIOLOGY

## 2024-12-05 RX ORDER — SODIUM CHLORIDE 0.9 % (FLUSH) 0.9 %
10 SYRINGE (ML) INJECTION AS NEEDED
Status: DISCONTINUED | OUTPATIENT
Start: 2024-12-05 | End: 2024-12-05 | Stop reason: HOSPADM

## 2024-12-05 RX ORDER — SODIUM CHLORIDE 0.9 % (FLUSH) 0.9 %
3 SYRINGE (ML) INJECTION EVERY 12 HOURS SCHEDULED
Status: DISCONTINUED | OUTPATIENT
Start: 2024-12-05 | End: 2024-12-05 | Stop reason: HOSPADM

## 2024-12-05 RX ORDER — PROPOFOL 10 MG/ML
INJECTION, EMULSION INTRAVENOUS AS NEEDED
Status: DISCONTINUED | OUTPATIENT
Start: 2024-12-05 | End: 2024-12-05 | Stop reason: SURG

## 2024-12-05 RX ORDER — LIDOCAINE HYDROCHLORIDE 20 MG/ML
INJECTION, SOLUTION INFILTRATION; PERINEURAL AS NEEDED
Status: DISCONTINUED | OUTPATIENT
Start: 2024-12-05 | End: 2024-12-05 | Stop reason: SURG

## 2024-12-05 RX ORDER — SODIUM CHLORIDE, SODIUM LACTATE, POTASSIUM CHLORIDE, CALCIUM CHLORIDE 600; 310; 30; 20 MG/100ML; MG/100ML; MG/100ML; MG/100ML
30 INJECTION, SOLUTION INTRAVENOUS CONTINUOUS PRN
Status: DISCONTINUED | OUTPATIENT
Start: 2024-12-05 | End: 2024-12-05 | Stop reason: HOSPADM

## 2024-12-05 RX ADMIN — LIDOCAINE HYDROCHLORIDE 40 MG: 20 INJECTION, SOLUTION INFILTRATION; PERINEURAL at 11:33

## 2024-12-05 RX ADMIN — PROPOFOL 140 MG: 10 INJECTION, EMULSION INTRAVENOUS at 11:33

## 2024-12-05 RX ADMIN — PROPOFOL 200 MCG/KG/MIN: 10 INJECTION, EMULSION INTRAVENOUS at 11:33

## 2024-12-05 NOTE — ANESTHESIA PREPROCEDURE EVALUATION
Anesthesia Evaluation     Patient summary reviewed   no history of anesthetic complications:   NPO Solid Status: > 8 hours  NPO Liquid Status: > 2 hours           Airway   Mallampati: II  TM distance: >3 FB  Neck ROM: full  No difficulty expected  Dental      Pulmonary     breath sounds clear to auscultation  (-) shortness of breath, recent URI, not a smoker  Cardiovascular   Exercise tolerance: good (4-7 METS)    Patient on routine beta blocker and Beta blocker given within 24 hours of surgery  Rhythm: regular  Rate: normal    (+) hypertension, hyperlipidemia  (-) past MI, dysrhythmias, angina      Neuro/Psych  (-) seizures, CVA  GI/Hepatic/Renal/Endo    (+) thyroid problem hypothyroidism  (-)  obesity    Musculoskeletal     (-) neck stiffness  Abdominal    Substance History      OB/GYN          Other                          Anesthesia Plan    ASA 2     MAC     (MAC anesthesia discussed with patient and/or patient representative. Risks (including but not limited to intra-op awareness), benefits, and alternatives were discussed. Understanding was voiced with an agreement to proceed with a MAC technique and General as a backup option. )    Anesthetic plan, risks, benefits, and alternatives have been provided, discussed and informed consent has been obtained with: patient.        CODE STATUS:

## 2024-12-05 NOTE — H&P
No chief complaint on file.      HPI  Patient today for an EGD due to dysphagia.         Problem List:    Patient Active Problem List   Diagnosis    Encounter for medical examination to establish care    Chronic allergic rhinitis    Essential hypertension    History of breast cancer    History of DVT of lower extremity    Hypertriglyceridemia    Hypothyroidism, adult    Colon cancer screening    Lung nodules    Enlargement of left sternoclavicular joint    Bunion of right foot    Esophageal dysphagia       Medical History:    Past Medical History:   Diagnosis Date    Disease of thyroid gland     History of DVT of lower extremity     Hypertension         Social History:    Social History     Socioeconomic History    Marital status:    Tobacco Use    Smoking status: Never     Passive exposure: Past    Smokeless tobacco: Never    Tobacco comments:     Caffeine use    Vaping Use    Vaping status: Never Used   Substance and Sexual Activity    Alcohol use: No    Drug use: No    Sexual activity: Defer       Family History:   Family History   Problem Relation Age of Onset    Stroke Mother     No Known Problems Father     Cancer Sister     Colon cancer Neg Hx     Colon polyps Neg Hx     Crohn's disease Neg Hx     Irritable bowel syndrome Neg Hx     Ulcerative colitis Neg Hx        Surgical History:   Past Surgical History:   Procedure Laterality Date    APPENDECTOMY      HYSTERECTOMY      REPLACEMENT TOTAL KNEE      x 2         Current Facility-Administered Medications:     lactated ringers infusion, 30 mL/hr, Intravenous, Continuous PRN, Elias, Cesia G, APRN    sodium chloride 0.9 % flush 10 mL, 10 mL, Intravenous, PRN, Elias, Cesia G, APRN    sodium chloride 0.9 % flush 3 mL, 3 mL, Intravenous, Q12H, Elias, Cesia G, APRN    Allergies:   Allergies   Allergen Reactions    Daypro [Oxaprozin]     Meperidine Other (See Comments)     Not sure reaction    Sulfa Antibiotics     Vicodin [Hydrocodone-Acetaminophen]          The following portions of the patient's history were reviewed by me and updated as appropriate: review of systems, allergies, current medications, past family history, past medical history, past social history, past surgical history and problem list.    There were no vitals filed for this visit.    PHYSICAL EXAM:    CONSTITUTIONAL:  today's vital signs reviewed by me  GASTROINTESTINAL: abdomen is soft nontender nondistended with normal active bowel sounds, no masses are appreciated    Assessment/ Plan  We will proceed today with EGD.    Risks and benefits as well as alternatives to endoscopic evaluation were explained to the patient and they voiced understanding and wish to proceed.  These risks include but are not limited to the risk of bleeding, perforation, adverse reaction to sedation, and missed lesions.  The patient was given the opportunity to ask questions prior to the endoscopic procedure.

## 2024-12-05 NOTE — ANESTHESIA POSTPROCEDURE EVALUATION
"Patient: Mary Barroso    Procedure Summary       Date: 12/05/24 Room / Location: [facility]    Anesthesia Start: 1128 Anesthesia Stop: 1146    Procedure: ESOPHAGOGASTRODUODENOSCOPY WITH DILATION Diagnosis:       Esophageal dysphagia      (Esophageal dysphagia [R13.19])    Surgeons: Feng Juan MD Provider: Ike Lawrence DO    Anesthesia Type: MAC ASA Status: 2            Anesthesia Type: MAC    Vitals  Vitals Value Taken Time   /79 12/05/24 1204   Temp 36.7 °C (98 °F) 12/05/24 1144   Pulse 65 12/05/24 1204   Resp 17 12/05/24 1204   SpO2 93 % 12/05/24 1204           Post Anesthesia Care and Evaluation    Patient location during evaluation: bedside  Patient participation: complete - patient participated  Level of consciousness: awake and alert  Pain management: adequate    Airway patency: patent  Anesthetic complications: No anesthetic complications  PONV Status: controlled  Cardiovascular status: acceptable and hemodynamically stable  Respiratory status: acceptable, spontaneous ventilation and nonlabored ventilation  Hydration status: acceptable    Comments: /79   Pulse 65   Temp 36.7 °C (98 °F) (Temporal)   Resp 17   Ht 165.1 cm (65\")   Wt 77.6 kg (171 lb)   SpO2 95%   BMI 28.46 kg/m²       "

## 2024-12-09 ENCOUNTER — TELEPHONE (OUTPATIENT)
Dept: FAMILY MEDICINE CLINIC | Facility: CLINIC | Age: 84
End: 2024-12-09

## 2024-12-09 NOTE — TELEPHONE ENCOUNTER
Caller: Mary Barroso    Relationship: Self    Best call back number: [phone]    Requested Prescriptions:     levothyroxine (SYNTHROID, LEVOTHROID) 100 MCG tablet     Requested Prescriptions      No prescriptions requested or ordered in this encounter        Pharm[facility] where request should be sent[facility]       [facility], [address] [phone] [facility] [phone]  [phone]         Last office visit with prescribing clinician: 10/7/2024   Last telemedicine visit with prescribing clinician: 10/3/2024   Next office visit with prescribing clinician: 1/17/2025     Additional details provided by patient: PATIENT IS REQUESTING A NEW 90 DAY PRESCRIPTION WITH REFILLS FOR THE ABOVE MEDICATION.  SHE STATES WILL BE NEW FOR [facility], BUT INSURANCE IS REQUESTING SINCE PATIENT IS NOW UNDER [facility]' CARE.    Does the patient have less than a 3 day supply:  [x] Yes  [] No    Would you like a call back once the refill request has been completed: [] Yes [] No    If the office needs to give you a call back, can they leave a voicemail: [] Yes [] No    Yasemin Washburn, Pra[employer] Rep   12/09/24 14:41 EST     PLEASE ADVISE.          
Patient informed to call [facility] to let them know that medication was already sent to [facility] pharmacy for #90 with 3 refills in September 2024  
Initial (On Arrival)

## 2025-01-16 ENCOUNTER — HOSPITAL ENCOUNTER (OUTPATIENT)
Facility: HOSPITAL | Age: 85
Discharge: HOME | End: 2025-01-16
Payer: MEDICARE

## 2025-01-16 DIAGNOSIS — R59.9 ENLARGED LYMPH NODES: ICD-10-CM

## 2025-01-16 DIAGNOSIS — R91.8 LUNG NODULES: ICD-10-CM

## 2025-01-16 PROCEDURE — 71260 CT THORAX DX C+: CPT

## 2025-01-16 PROCEDURE — 25510000001 IOPAMIDOL 61 % SOLUTION

## 2025-01-16 RX ORDER — IOPAMIDOL 612 MG/ML
100 INJECTION, SOLUTION INTRAVASCULAR
Status: COMPLETED | OUTPATIENT
Start: 2025-01-16 | End: 2025-01-16

## 2025-01-16 RX ADMIN — IOPAMIDOL 75 ML: 612 INJECTION, SOLUTION INTRAVENOUS at 13:09

## 2025-01-17 ENCOUNTER — OFFICE VISIT (OUTPATIENT)
Dept: FAMILY MEDICINE CLINIC | Facility: CLINIC | Age: 85
End: 2025-01-17
Payer: MEDICARE

## 2025-01-17 VITALS
TEMPERATURE: 97.1 F | OXYGEN SATURATION: 99 % | HEIGHT: 65 IN | WEIGHT: 170 LBS | SYSTOLIC BLOOD PRESSURE: 124 MMHG | DIASTOLIC BLOOD PRESSURE: 78 MMHG | BODY MASS INDEX: 28.32 KG/M2 | HEART RATE: 65 BPM

## 2025-01-17 DIAGNOSIS — Z85.3 HISTORY OF BREAST CANCER: ICD-10-CM

## 2025-01-17 DIAGNOSIS — R91.8 LUNG NODULES: ICD-10-CM

## 2025-01-17 DIAGNOSIS — R59.9 ENLARGED LYMPH NODES: ICD-10-CM

## 2025-01-17 DIAGNOSIS — I10 ESSENTIAL HYPERTENSION: Primary | ICD-10-CM

## 2025-01-17 DIAGNOSIS — R13.19 ESOPHAGEAL DYSPHAGIA: ICD-10-CM

## 2025-01-17 DIAGNOSIS — K76.9 LIVER LESION: ICD-10-CM

## 2025-01-17 DIAGNOSIS — N28.9 RENAL LESION: ICD-10-CM

## 2025-01-17 DIAGNOSIS — Q45.3 ABNORMALITY OF PANCREATIC DUCT: ICD-10-CM

## 2025-01-17 DIAGNOSIS — E03.9 HYPOTHYROIDISM, ADULT: ICD-10-CM

## 2025-01-17 DIAGNOSIS — I71.21 ANEURYSM OF ASCENDING AORTA WITHOUT RUPTURE: ICD-10-CM

## 2025-01-17 NOTE — PROGRESS NOTES
Chief Complaint  Discuss recent results of testing    Subjective          History of Present Illness  Daughter present with her.      She established care with the Oncologist 11/19/24; took her off anastrozole.       She is currently being seen by GI for abnormal ct abdomen findings of hepatic cyst, an indeterminant liver lesion, small amount of gallbladder wall thickening, severe atrophy of the pancreatic tail with mild pancreatic ductal dilatation and dysphagia.  Patient had an EGD on 12/5/24 that was normal and esophagus dilated.  Patient reports she is doing well, her dysphagia has improved significantly.     Aneurysm of ascending aorta without rupture   Saw cardiology on 10/18/24.  Monitoring size of aneurysm if there has not been any growth in her aortic size, recommend repeat CT a year from that time. This is most likely degenerative, we discussed avoiding lifting heavy weights, Valsalva maneuver, and making sure her blood pressure is well-controlled on atenolol, as it is today.     Lung Nodules/Enlarged lymph nodes  Patient is currently being followed by pulm and had her repeat CT of chest yesterday for 3 month follow up. Waiting on results.  09/30/24 CT chest;  1.  Incompletely assessed hepatic lesions, as above. Recommend comparison with prior imaging, if available. Otherwise, recommend further evaluation with multiphase CT or contrast-enhanced MRI. A pancreatic tail lesion is also incompletely assessed. Recommend attention on multiphase CT or MRI.  2.  Prominent and mildly enlarged right hilar lymph nodes are nonspecific. Recommend comparison with prior imaging, if available. Otherwise, recommend short-term follow-up contrast-enhanced CT chest in 3 months.  3.  Scattered small pulmonary nodules measuring up to 0.6 cm are nonspecific. Recommend comparison with prior imaging, if available. Otherwise, recommend attention on follow-up.  4.  Thoracic aortic aneurysm.  5.  Asymmetric left sternoclavicular  "osteoarthritis.         Objective   Vital Signs:  /78 (BP Location: Left arm, Patient Position: Sitting, Cuff Size: Adult)   Pulse 65   Temp 97.1 °F (36.2 °C)   Ht 165.1 cm (65\")   Wt 77.1 kg (170 lb)   SpO2 99%   BMI 28.29 kg/m²   Estimated body mass index is 28.29 kg/m² as calculated from the following:    Height as of this encounter: 165.1 cm (65\").    Weight as of this encounter: 77.1 kg (170 lb).            Physical Exam  Vitals reviewed.   Constitutional:       General: She is not in acute distress.     Appearance: Normal appearance.   HENT:      Head: Normocephalic and atraumatic.   Eyes:      Conjunctiva/sclera: Conjunctivae normal.      Pupils: Pupils are equal, round, and reactive to light.   Cardiovascular:      Rate and Rhythm: Normal rate and regular rhythm.      Pulses: Normal pulses.      Heart sounds: No murmur heard.     No gallop.   Pulmonary:      Effort: Pulmonary effort is normal. No respiratory distress.      Breath sounds: Normal breath sounds. No wheezing.   Abdominal:      General: Bowel sounds are normal. There is no distension.      Palpations: Abdomen is soft.      Tenderness: There is no abdominal tenderness.   Musculoskeletal:         General: Normal range of motion.      Cervical back: Normal range of motion and neck supple. No tenderness.   Skin:     General: Skin is warm and dry.   Neurological:      Mental Status: She is alert and oriented to person, place, and time. Mental status is at baseline.   Psychiatric:         Mood and Affect: Mood normal.        Result Review :                Assessment and Plan   Diagnoses and all orders for this visit:    1. Essential hypertension (Primary)    2. History of breast cancer    3. Hypothyroidism, adult    4. Esophageal dysphagia    5. Lung nodules    6. Liver lesion    7. Renal lesion    8. Abnormality of pancreatic duct    9. Aneurysm of ascending aorta without rupture    10. Enlarged lymph nodes    Overall patient is doing well " today. HTN is well controlled on her atenolol.   Hypothyroidism well controlled on her synthroid.   Patient has seen all specialists needed for abnormal ct findings in 9/24.  Patent had her 3 month follow up repeat chest ct yesterday, awaiting results.  Will send results to cardiology and her pulm.  Follow up with specialists as scheduled.  Will contact patient with ct results.  F/u in 4 months for AWV.          Follow Up   Return in about 4 months (around 5/17/2025), or if symptoms worsen or fail to improve, for Medicare Wellness.  Patient was given instructions and counseling regarding her condition or for health maintenance advice. Please see specific information pulled into the AVS if appropriate.

## 2025-01-18 PROBLEM — R59.9 ENLARGED LYMPH NODES: Status: ACTIVE | Noted: 2025-01-18

## 2025-01-18 PROBLEM — N28.9 RENAL LESION: Status: ACTIVE | Noted: 2025-01-18

## 2025-01-18 PROBLEM — I71.21 ANEURYSM OF ASCENDING AORTA WITHOUT RUPTURE: Status: ACTIVE | Noted: 2025-01-18

## 2025-01-18 PROBLEM — Q45.3 ABNORMALITY OF PANCREATIC DUCT: Status: ACTIVE | Noted: 2025-01-18

## 2025-01-18 PROBLEM — K76.9 LIVER LESION: Status: ACTIVE | Noted: 2025-01-18

## 2025-01-20 ENCOUNTER — TELEPHONE (OUTPATIENT)
Dept: FAMILY MEDICINE CLINIC | Facility: CLINIC | Age: 85
End: 2025-01-20
Payer: MEDICARE

## 2025-01-20 DIAGNOSIS — M21.611 BUNION OF RIGHT FOOT: Primary | ICD-10-CM

## 2025-01-20 NOTE — TELEPHONE ENCOUNTER
[facility] called and stated the patient has an appointment this afternoon at 130 and needs a referral in order to be seen. There is not one in the chart. Fax# [phone]

## 2025-04-11 ENCOUNTER — HOSPITAL ENCOUNTER (OUTPATIENT)
Facility: HOSPITAL | Age: 85
Discharge: HOME | End: 2025-04-11
Payer: MEDICARE

## 2025-04-11 DIAGNOSIS — Q45.3 ABNORMALITY OF PANCREATIC DUCT: ICD-10-CM

## 2025-04-11 DIAGNOSIS — K76.9 LIVER LESION: ICD-10-CM

## 2025-04-11 PROCEDURE — 74183 MRI ABD W/O CNTR FLWD CNTR: CPT

## 2025-04-11 PROCEDURE — 25510000002 GADOBENATE DIMEGLUMINE 529 MG/ML SOLUTION: Performed by: NURSE PRACTITIONER

## 2025-04-11 PROCEDURE — A9577 INJ MULTIHANCE: HCPCS | Performed by: NURSE PRACTITIONER

## 2025-04-11 RX ADMIN — GADOBENATE DIMEGLUMINE 16 ML: 529 INJECTION, SOLUTION INTRAVENOUS at 13:36

## 2025-05-13 ENCOUNTER — OFFICE VISIT (OUTPATIENT)
Dept: FAMILY MEDICINE CLINIC | Facility: CLINIC | Age: 85
End: 2025-05-13
Payer: MEDICARE

## 2025-05-13 VITALS
DIASTOLIC BLOOD PRESSURE: 68 MMHG | HEART RATE: 72 BPM | TEMPERATURE: 97 F | BODY MASS INDEX: 27.76 KG/M2 | SYSTOLIC BLOOD PRESSURE: 122 MMHG | OXYGEN SATURATION: 95 % | HEIGHT: 65 IN | WEIGHT: 166.6 LBS

## 2025-05-13 DIAGNOSIS — Z09 ENCOUNTER FOR FOLLOW-UP EXAMINATION AFTER COMPLETED TREATMENT FOR CONDITIONS OTHER THAN MALIGNANT NEOPLASM: ICD-10-CM

## 2025-05-13 DIAGNOSIS — Q45.3 ABNORMALITY OF PANCREATIC DUCT: ICD-10-CM

## 2025-05-13 DIAGNOSIS — K76.9 LIVER LESION: ICD-10-CM

## 2025-05-13 DIAGNOSIS — E03.9 HYPOTHYROIDISM, ADULT: ICD-10-CM

## 2025-05-13 DIAGNOSIS — N28.9 RENAL LESION: ICD-10-CM

## 2025-05-13 DIAGNOSIS — E78.1 HYPERTRIGLYCERIDEMIA: ICD-10-CM

## 2025-05-13 DIAGNOSIS — R26.2 DIFFICULTY WALKING: ICD-10-CM

## 2025-05-13 DIAGNOSIS — Z13.820 OSTEOPOROSIS SCREENING: ICD-10-CM

## 2025-05-13 DIAGNOSIS — R91.8 LUNG NODULES: ICD-10-CM

## 2025-05-13 DIAGNOSIS — L98.9 SKIN LESION: ICD-10-CM

## 2025-05-13 DIAGNOSIS — I10 ESSENTIAL HYPERTENSION: Primary | ICD-10-CM

## 2025-05-13 DIAGNOSIS — I71.21 ANEURYSM OF ASCENDING AORTA WITHOUT RUPTURE: ICD-10-CM

## 2025-05-13 DIAGNOSIS — R29.898 BILATERAL LEG WEAKNESS: ICD-10-CM

## 2025-05-13 NOTE — PROGRESS NOTES
Subjective   The ABCs of the Annual Wellness Visit  Medicare Wellness Visit      Mary Barroso is a 85 y.o. patient who presents for a Medicare Wellness Visit.    The following portions of the patient's history were reviewed and   updated as appropriate: She  has a past medical history of Allergic (1998), Arthritis (2005), Cataract (2011), Disease of thyroid gland, GERD (gastroesophageal reflux disease) (1964), History of DVT of lower extremity, Hypertension, and Hypothyroidism (2000).  She does not have any pertinent problems on file.  She  has a past surgical history that includes Hysterectomy; Replacement total knee; Appendectomy; Colonoscopy; Esophagogastroduodenoscopy (N/A, 12/05/2024); Eye surgery (1948); and Joint replacement (2005).  Her family history includes Cancer in her sister; No Known Problems in her father; Stroke in her mother.  She  reports that she has never smoked. She has been exposed to tobacco smoke. She has never used smokeless tobacco. She reports that she does not drink alcohol and does not use drugs.  Current Outpatient Medications   Medication Sig Dispense Refill    atenolol (TENORMIN) 25 MG tablet Take 1 tablet by mouth Daily for 360 days. (Patient taking differently: Take 1 tablet by mouth Every Night.) 90 tablet 3    cetirizine (zyrTEC) 10 MG tablet Take 1 tablet by mouth Daily for 360 days. 90 tablet 3    levothyroxine (SYNTHROID, LEVOTHROID) 100 MCG tablet Take 1 tablet by mouth Daily for 360 days. 90 tablet 3    multivitamin with minerals (Multivitamin Adults) tablet tablet Take 1 tablet by mouth Daily for 360 days. 90 tablet 3    Omega-3 Fatty Acids (fish oil) 1000 MG capsule capsule Take 1 capsule by mouth Daily With Breakfast for 360 days. 90 capsule 3    Turmeric 500 MG tablet Take 1 tablet by mouth Daily for 360 days. 90 tablet 3     No current facility-administered medications for this visit.     She is allergic to sulfa antibiotics, vicodin [hydrocodone-acetaminophen],  daypro [oxaprozin], and meperidine..    Compared to one year ago, the patient's physical   health is worse.  Compared to one year ago, the patient's mental   health is the same.    Recent Hospitalizations:  She was not admitted to the hospital during the last year.     Current Medical Providers:  Patient Care Team:  Val Ivan APRN as PCP - General (Nurse Practitioner)  Cesia Griffin APRN as Nurse Practitioner (Nurse Practitioner)  Val Ivan APRN as Referring Physician (Nurse Practitioner)  Trixie Moscoso MD as Consulting Physician (Hematology and Oncology)    Outpatient Medications Prior to Visit   Medication Sig Dispense Refill    atenolol (TENORMIN) 25 MG tablet Take 1 tablet by mouth Daily for 360 days. (Patient taking differently: Take 1 tablet by mouth Every Night.) 90 tablet 3    cetirizine (zyrTEC) 10 MG tablet Take 1 tablet by mouth Daily for 360 days. 90 tablet 3    levothyroxine (SYNTHROID, LEVOTHROID) 100 MCG tablet Take 1 tablet by mouth Daily for 360 days. 90 tablet 3    multivitamin with minerals (Multivitamin Adults) tablet tablet Take 1 tablet by mouth Daily for 360 days. 90 tablet 3    Omega-3 Fatty Acids (fish oil) 1000 MG capsule capsule Take 1 capsule by mouth Daily With Breakfast for 360 days. 90 capsule 3    Turmeric 500 MG tablet Take 1 tablet by mouth Daily for 360 days. 90 tablet 3     No facility-administered medications prior to visit.     No opioid medication identified on active medication list. I have reviewed chart for other potential  high risk medication/s and harmful drug interactions in the elderly.      Aspirin is not on active medication list.  Aspirin use is not indicated based on review of current medical condition/s. Risk of harm outweighs potential benefits.  .    Patient Active Problem List   Diagnosis    Encounter for medical examination to establish care    Chronic allergic rhinitis    Essential hypertension    History of breast cancer     "History of DVT of lower extremity    Hypertriglyceridemia    Hypothyroidism, adult    Colon cancer screening    Lung nodules    Enlargement of left sternoclavicular joint    Bunion of right foot    Esophageal dysphagia    Liver lesion    Renal lesion    Abnormality of pancreatic duct    Aneurysm of ascending aorta without rupture    Enlarged lymph nodes    Bilateral leg weakness    Difficulty walking    Osteoporosis screening    Encounter for follow-up examination after completed treatment for conditions other than malignant neoplasm    Skin lesion     Advance Care Planning Advance Directive is not on file.  ACP discussion was held with the patient during this visit. Patient has an advance directive (not in EMR), copy requested.            Objective   Vitals:    25 0900   BP: 122/68   Pulse: 72   Temp: 97 °F (36.1 °C)   TempSrc: Temporal   SpO2: 95%   Weight: 75.6 kg (166 lb 9.6 oz)   Height: 165.1 cm (65\")       Estimated body mass index is 27.72 kg/m² as calculated from the following:    Height as of this encounter: 165.1 cm (65\").    Weight as of this encounter: 75.6 kg (166 lb 9.6 oz).                Does the patient have evidence of cognitive impairment? Yes   Mini-cog 5                                                                                             Health  Risk Assessment    Smoking Status:  Social History     Tobacco Use   Smoking Status Never    Passive exposure: Past   Smokeless Tobacco Never   Tobacco Comments    Caffeine use      Alcohol Consumption:  Social History     Substance and Sexual Activity   Alcohol Use No       Fall Risk Screen  STEADI Fall Risk Assessment was completed, and patient is at LOW risk for falls.Assessment completed on:2025    Depression Screening   Little interest or pleasure in doing things? Not at all   Feeling down, depressed, or hopeless? Not at all   PHQ-2 Total Score 0      Health Habits and Functional and Cognitive Screenin/10/2025     4:34 PM "   Functional & Cognitive Status   Do you have difficulty preparing food and eating? No   Do you have difficulty bathing yourself, getting dressed or grooming yourself? No   Do you have difficulty using the toilet? No   Do you have difficulty moving around from place to place? No   Do you have trouble with steps or getting out of a bed or a chair? No   Current Diet Limited Junk Food   Dental Exam Up to date   Eye Exam Up to date   Exercise (times per week) 3 times per week   Current Exercises Include Gardening;House Cleaning;Swimming   Do you need help using the phone?  No   Are you deaf or do you have serious difficulty hearing?  No   Do you need help to go to places out of walking distance? No   Do you need help shopping? No   Do you need help preparing meals?  No   Do you need help with housework?  No   Do you need help with laundry? No   Do you need help taking your medications? No   Do you need help managing money? No   Do you ever drive or ride in a car without wearing a seat belt? No   Have you felt unusual stress, anger or loneliness in the last month? No   Who do you live with? Alone   If you need help, do you have trouble finding someone available to you? No   Have you been bothered in the last four weeks by sexual problems? No   Do you have difficulty concentrating, remembering or making decisions? No           Age-appropriate Screening Schedule:  Refer to the list below for future screening recommendations based on patient's age, sex and/or medical conditions. Orders for these recommended tests are listed in the plan section. The patient has been provided with a written plan.    Health Maintenance List  Health Maintenance   Topic Date Due    DXA SCAN  12/19/2024    LIPID PANEL  03/26/2025    COVID-19 Vaccine (8 - 2024-25 season) 04/07/2025    Pneumococcal Vaccine 50+ (1 of 1 - PCV) 01/18/2026 (Originally 3/16/1990)    INFLUENZA VACCINE  07/01/2025    ANNUAL WELLNESS VISIT  05/13/2026    COLORECTAL CANCER  SCREENING  09/24/2027    TDAP/TD VACCINES (2 - Td or Tdap) 12/28/2027    RSV Vaccine - Adults  Completed    ZOSTER VACCINE  Completed                                                                                                                                                CMS Preventative Services Quick Reference  Risk Factors Identified During Encounter  Immunizations Discussed/Encouraged: COVID19    The above risks/problems have been discussed with the patient.  Pertinent information has been shared with the patient in the After Visit Summary.  An After Visit Summary and PPPS were made available to the patient.    Follow Up:   Next Medicare Wellness visit to be scheduled in 1 year.         Additional E&M Note during same encounter follows:  Patient has additional, significant, and separately identifiable condition(s)/problem(s) that require work above and beyond the Medicare Wellness Visit     Chief Complaint  Medicare Wellness-subsequent    Subjective     HPI  Mary is also being seen today for additional medical problem/s.          Difficulty walking  Reports increased difficulty with walking and stepping off the steps, example stepping off the sidewalk.  steps  This really increases her anxiety and she is afraid of falling.    Nerves  PT request today.  Reports overall she does not feel weaker but somewhat off balance.    Fell a few months ago in the winter, fell fixing the rug. Denies injury.  More apprehensive about driving now, not driving as much.   Golfing still once a week, doing fine with that, gets out of cart fine, swings golf club fine, but she is not worrying/thinking about falling when she is playing.      HTN  Chronic, ongoing, well-controlled.  Current medication regimen atenolol.  BP Readings from Last 3 Encounters:   05/13/25 122/68   01/17/25 124/78   12/05/24 126/66         Hypothyroidism   Chronic, ongoing, well-controlled.  Current medication regimen synthroid.   Lab Results  "  Component Value Date    TSH 1.120 06/09/2022 03/26/24 Lipid panel  Triglycerides 157  Cholesterol 203  HDL 51      Skin lesion left forearm  Reports she is seeing a new Derm on Thursday.  saw one in the past, said area was fine but has been there a year and has not healed yet.             04/11/25 mri abdomen MRCP   Patient is currently followed by gastro.    Recommended to have a follow-up in 6 months.  Patient stating she may want to push out follow-up for 1 year.  Discussed with patient this is completely up to her and she may discuss this with her gastro provider.    She denies any abdominal pain, no general surgery or further workup for gallbladder at this time.  IMPRESSION  1.  Indeterminate 2.1 cm lesion within the posterior right hepatic lobe  which is grossly unchanged in size since 10/7/2024. While stability is  reassuring, continued follow-up with MRI of the abdomen in 6 months is  recommended to ensure stability.  2.  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  Attention on above mentioned follow up recommended to ensure stability.  3.  Other findings above        Aneurysm of ascending aorta without rupture; followed by cardiology.  Saw cardiology on 10/18/24.  Monitoring size of aneurysm if there has not been any growth in her aortic size, recommend repeat CT a year from that time. This is most likely degenerative, we discussed avoiding lifting heavy weights, Valsalva maneuver, and making sure her blood pressure is well-controlled on atenolol, as it is today.     Lung Nodules/Enlarged lymph nodes  Patient is currently being followed by pulm.  Repeat ct in 1 year, he has ordered already.      Objective   Vital Signs:  /68   Pulse 72   Temp 97 °F (36.1 °C) (Temporal)   Ht 165.1 cm (65\")   Wt 75.6 kg (166 lb 9.6 oz)   SpO2 95%   BMI 27.72 kg/m²   Physical Exam  Vitals reviewed.   Constitutional:       General: She is not in acute distress.     Appearance: " Normal appearance.   HENT:      Head: Normocephalic and atraumatic.   Eyes:      Conjunctiva/sclera: Conjunctivae normal.      Pupils: Pupils are equal, round, and reactive to light.   Cardiovascular:      Rate and Rhythm: Normal rate and regular rhythm.      Pulses: Normal pulses.      Heart sounds: No murmur heard.     No gallop.   Pulmonary:      Effort: Pulmonary effort is normal. No respiratory distress.      Breath sounds: Normal breath sounds. No wheezing.   Abdominal:      General: Bowel sounds are normal. There is no distension.      Palpations: Abdomen is soft.      Tenderness: There is no abdominal tenderness.   Musculoskeletal:         General: Normal range of motion.      Cervical back: Normal range of motion and neck supple. No tenderness.      Right lower leg: No edema.      Left lower leg: No edema.   Skin:     General: Skin is warm and dry.      Findings: Lesion present.      Comments: Left fore arm, skin lesion, see picture in chart.   Neurological:      Mental Status: She is alert and oriented to person, place, and time. Mental status is at baseline.   Psychiatric:         Mood and Affect: Mood normal.         The following data was reviewed by: Val Ivan APRN on 05/13/2025:           Assessment and Plan      Essential hypertension      Orders:    Comprehensive Metabolic Panel    Hypothyroidism, adult    Orders:    TSH Rfx On Abnormal To Free T4    Lung nodules         Liver lesion         Renal lesion           Abnormality of pancreatic duct         Aneurysm of ascending aorta without rupture         Hypertriglyceridemia       Orders:    Lipid Panel    Bilateral leg weakness    Orders:    Ambulatory Referral to Physical Therapy for Evaluation & Treatment    Difficulty walking    Orders:    Ambulatory Referral to Physical Therapy for Evaluation & Treatment    Osteoporosis screening    Orders:    DEXA Bone Density Axial; Future    Encounter for follow-up examination after completed  treatment for conditions other than malignant neoplasm    Orders:    DEXA Bone Density Axial; Future    Skin lesion         Will follow-up with lab results.  Hypertension, chronic, well-controlled, continue atenolol.  Hypothyroidism, chronic, well-controlled, continue Synthroid.  DEXA scan order placed today.  Discussed with daughter and patient, if they would like to change MRCP of abdomen to 1 year follow-up instead of 6 months, they need to discuss this with her gastro provider.  They voiced understanding.  Referral placed for physical therapy to work on balance and stepping down.  If too expensive, let me know.  Follow-up with all specialist as scheduled.       Follow Up   Return in about 6 months (around 11/13/2025) for Recheck.  Patient was given instructions and counseling regarding her condition or for health maintenance advice. Please see specific information pulled into the AVS if appropriate.

## 2025-05-14 LAB
ALBUMIN SERPL-MCNC: 4.2 G/DL (ref 3.5–5.2)
ALBUMIN/GLOB SERPL: 1.9 G/DL
ALP SERPL-CCNC: 85 U/L (ref 39–117)
ALT SERPL-CCNC: 13 U/L (ref 1–33)
AST SERPL-CCNC: 20 U/L (ref 1–32)
BILIRUB SERPL-MCNC: 0.7 MG/DL (ref 0–1.2)
BUN SERPL-MCNC: 18 MG/DL (ref 8–23)
BUN/CREAT SERPL: 20.9 (ref 7–25)
CALCIUM SERPL-MCNC: 9.8 MG/DL (ref 8.6–10.5)
CHLORIDE SERPL-SCNC: 104 MMOL/L (ref 98–107)
CHOLEST SERPL-MCNC: 214 MG/DL (ref 0–200)
CO2 SERPL-SCNC: 25 MMOL/L (ref 22–29)
CREAT SERPL-MCNC: 0.86 MG/DL (ref 0.57–1)
EGFRCR SERPLBLD CKD-EPI 2021: 65.9 ML/MIN/1.73
GLOBULIN SER CALC-MCNC: 2.2 GM/DL
GLUCOSE SERPL-MCNC: 98 MG/DL (ref 65–99)
HDLC SERPL-MCNC: 50 MG/DL (ref 40–60)
LDLC SERPL CALC-MCNC: 138 MG/DL (ref 0–100)
POTASSIUM SERPL-SCNC: 4.5 MMOL/L (ref 3.5–5.2)
PROT SERPL-MCNC: 6.4 G/DL (ref 6–8.5)
SODIUM SERPL-SCNC: 141 MMOL/L (ref 136–145)
TRIGL SERPL-MCNC: 143 MG/DL (ref 0–150)
TSH SERPL DL<=0.005 MIU/L-ACNC: 0.51 UIU/ML (ref 0.27–4.2)
VLDLC SERPL CALC-MCNC: 26 MG/DL (ref 5–40)

## 2025-05-16 ENCOUNTER — RESULTS FOLLOW-UP (OUTPATIENT)
Dept: FAMILY MEDICINE CLINIC | Facility: CLINIC | Age: 85
End: 2025-05-16

## 2025-06-02 ENCOUNTER — TREATMENT (OUTPATIENT)
Dept: PHYSICAL THERAPY | Facility: CLINIC | Age: 85
End: 2025-06-02
Payer: MEDICARE

## 2025-06-02 DIAGNOSIS — R26.89 DECREASED FUNCTIONAL MOBILITY: ICD-10-CM

## 2025-06-02 DIAGNOSIS — Z91.81 HISTORY OF FALLING: ICD-10-CM

## 2025-06-02 DIAGNOSIS — R26.2 DIFFICULTY WALKING: ICD-10-CM

## 2025-06-02 DIAGNOSIS — R29.898 BILATERAL LEG WEAKNESS: Primary | ICD-10-CM

## 2025-06-02 PROCEDURE — 97162 PT EVAL MOD COMPLEX 30 MIN: CPT

## 2025-06-02 PROCEDURE — 97110 THERAPEUTIC EXERCISES: CPT

## 2025-06-02 PROCEDURE — 97535 SELF CARE MNGMENT TRAINING: CPT

## 2025-06-02 NOTE — PROGRESS NOTES
Physical Therapy Initial Evaluation and Plan of Care  [facility]  [place] - [address], [address]     [place] [address]   Phone: [phone]   Fax: [phone]    Patient: Mary Barroso   : 1940  Diagnosis/ICD-10 Code:  Bilateral leg weakness [R29.898]  Referring practitioner: RIP Morillo*  Date of Initial Visit: 2025  Today's Date: 2025  Patient seen for 1 session         Visit Diagnoses:    ICD-10-CM ICD-9-CM   1. Bilateral leg weakness  R29.898 729.89   2. Difficulty walking  R26.2 719.7   3. History of falling  Z91.81 V15.88   4. Decreased functional mobility  R26.89 781.99         Subjective Questionnaire: ABC: 36/56      Subjective Evaluation    History of Present Illness  Mechanism of injury: Patient is an 84 y/o female who presents to physical therapy with the diagnosis of bilateral lower extremity weakness with a balance disorder, with history of falling. She currently ambulates into the clinic without an assistive device. However, she does report having bilateral trekking poles that she uses when ambulating farther distances. She lives in a single level home with 16 steps to enter with bilateral hand rails. She reports her last fall occurred several months ago, when she was leaning over to adjust a rug on the floor and fell to the floor. She reports having to call her next door neighbor to help her back to a chair. She denies any injury with this fall.     She does report greatest issues with navigating stairs, especially descending stairs, and with reaching to the floor. She expresses fear of navigating curbs, especially with descending stairs.     She does report history of bilateral knee replacement with complications with the L TKA of blood clots, and she feels she was unable to fully rehab her L knee. She generally feels less confident with L lower extremity.    Her overall goals with physical therapy is to improve  confidence with overall functional mobility, decrease risk of falling, and create greater ease with navigating stairs.       Patient Occupation: Retired - [employer]  No pain reported    Social Support  Lives in: condominium (16 steps to enter w/ bilateral hand rails. Does have elevator, but currently out of comission.)  Lives with: alone    Patient Goals  Patient goals for therapy: improved balance, increased motion, increased strength and independence with ADLs/IADLs           Pertinent PMH: andreia TKA R in 2005, L in 2011. Reports having DVT following replacement in 2011, chronic L hip, hypertension, hypothyroidism.       Objective          Active Range of Motion   Left Knee   Flexion: 120 degrees   Extension: 2 (lacking) degrees     Right Knee   Flexion: 126 degrees   Extension: 0 degrees     Strength/Myotome Testing     Left Hip   Planes of Motion   Flexion: 4  Extension: 4  Abduction: 4-  Adduction: 4    Right Hip   Planes of Motion   Flexion: 4  Extension: 4  Abduction: 4-  Adduction: 4    Left Knee   Flexion: 4  Extension: 4    Right Knee   Flexion: 4  Extension: 4    Left Ankle/Foot   Dorsiflexion: 4+  Plantar flexion: 4+    Right Ankle/Foot   Dorsiflexion: 4+  Plantar flexion: 4+     Functional mobility:  Maki balance scale: 36/56  30 second sit to stand: 10 reps without UE support    Balance:  Romberg stance on firm surface: 30+ seconds  Romberg stance on airex eyes open: 30+ seconds  Romberg stance on airex eyes closed: 8.4 seconds with significant truncal sway  Tandem stance on airex: unable with either limb leading  SLS on airex: unable with either lower extremity    Assessment & Plan       Assessment  Impairments: abnormal coordination, abnormal gait, activity intolerance, impaired balance, impaired physical strength, lacks appropriate home exercise program and safety issue   Functional limitations: walking, standing, stooping and reaching overhead   Assessment details: Patient is an 84 y/o female  who presents to physical therapy with diagnosis of balance disorder, and reports 1 fall in the past year. Patient currently ambulates without an assistive device, but does have cane/trekking poles. Patient has PMH significant for andreia TKA. Patient demonstrates decreased muscle power of bilateral lower extremities, decreased ROM and mobility of bilateral LEs, is a moderate fall risk according to scoring below threshold on SEGURA balance scale. Due to deficits, patient exhibits difficulty with ambulating on even and uneven surface, negotiating curbs/stairs, navigating the community, and has difficulty with transitional movements. Secondary to limitations, patient has difficulty participating in typical ADLs, such as house chores and cooking, and expresses difficulty participating in community events such as golfing, and spending time with family and friends. Patient scored a 79% on the ABC scale. Patient is a good candidate to initiate outpatient physical therapy to improve LE strength, improve functional mobility, and decrease risk for future falls.     Prognosis: good    Goals  Plan Goals: SHORT TERM GOALS:   5 weeks  1. Pt will be compliant with HEP.  2. Pt to improve 30 sec sit to stand to 11 reps without UE support, demonstrating improvements in strength, endurance, and balance with transitional movements.   3. Pt will demonstrate 4+/5 B LE strength in order to improve gait, transfers and stair climbing ability.  4. Pt to improve SLS to 1+ seconds, demonstrating improvements in standing balance.  5. Pt to demonstrate appropriate use of assistive device, and ambulate x500ft without overt loss of balance.      LONG TERM GOALS:  10 weeks  1.Pt will be able to descend two 6in steps without use of UE in order to more safely navigate the community.   2. Pt will be able to climb 16 steps in reciprocal pattern with andreia UE due to improved strength, in order to safely enter/exit her home.  3. Pt will be able to perform 12  sit-stand without use of UE due to improved strength.  4. Pt will be able to stand on foam for 15+ sec or greater with eyes closed due to improved balance.  5. Pt to score at least 45/56 on SEGURA assessment.        Plan  Therapy options: will be seen for skilled therapy services  Planned modality interventions: cryotherapy and thermotherapy (hydrocollator packs)  Planned therapy interventions: abdominal trunk stabilization, ADL retraining, body mechanics training, balance/weight-bearing training, flexibility, functional ROM exercises, gait training, home exercise program, neuromuscular re-education, motor coordination training, postural training, soft tissue mobilization, strengthening, stretching, therapeutic activities, transfer training and IADL retraining  Frequency: 2x week  Duration in weeks: 8  Treatment plan discussed with: patient        History # of Personal Factors and/or Comorbidities: MODERATE (1-2)  Examination of Body System(s): # of elements: MODERATE (3)  Clinical Presentation: EVOLVING  Clinical Decision Making: MODERATE      Timed:         Manual Therapy:         mins  56193;     Therapeutic Exercise:    15     mins  40836;     Neuromuscular Fidel:        mins  04230;    Therapeutic Activity:          mins  25463;     Gait Training:           mins  33572;     Ultrasound:          mins  09736;    Ionto                                   mins   18587  Self Care                       15     mins   50883      Un-Timed:  Electrical Stimulation:         mins  44704 ( );  Dry Needling          mins self-pay  Traction          mins 57998  Low Eval          Mins  47513  Mod Eval     30     Mins  67620  High Eval                            Mins  99390  Canalith Repos         mins 29404      Timed Treatment:   30   mins   Total Treatment:     60   mins          PT: Fabián Otero PT     License Number: [place] [id number]# [id number] [place] LIC# [id number]  Electronically signed by Fabián Otero PT, 06/02/25, 1:32 PM  EDT    Certification Period: 6/2/2025 thru 8/30/2025  I certify that the therapy services are furnished while this patient is under my care.  The services outlined above are required by this patient, and will be reviewed every 90 days.         Physician Signature:__________________________________________________    PHYSICIAN: Val Ivan APRN  NPI: [id number]                                      DATE:      Please sign and return via fax to [phone]. Thank you, [facility].

## 2025-06-05 ENCOUNTER — TREATMENT (OUTPATIENT)
Dept: PHYSICAL THERAPY | Facility: CLINIC | Age: 85
End: 2025-06-05
Payer: MEDICARE

## 2025-06-05 DIAGNOSIS — R29.898 BILATERAL LEG WEAKNESS: Primary | ICD-10-CM

## 2025-06-05 DIAGNOSIS — R26.2 DIFFICULTY WALKING: ICD-10-CM

## 2025-06-05 DIAGNOSIS — R26.89 DECREASED FUNCTIONAL MOBILITY: ICD-10-CM

## 2025-06-05 DIAGNOSIS — Z91.81 HISTORY OF FALLING: ICD-10-CM

## 2025-06-05 NOTE — PROGRESS NOTES
Physical Therapy Daily Treatment Note  [facility] Physical Therapy  [place] - [address], [address]     [place] [id number]   Phone: [phone]   Fax: [phone]      Patient: Mary Barroso   : 1940  Referring practitioner: RIP Morillo*  Date of Initial Visit: Type: THERAPY  Noted: 2025  Today's Date: 2025  Patient seen for 2 sessions       Visit Diagnoses:    ICD-10-CM ICD-9-CM   1. Bilateral leg weakness  R29.898 729.89   2. Difficulty walking  R26.2 719.7   3. History of falling  Z91.81 V15.88   4. Decreased functional mobility  R26.89 781.99         Subjective:  Mary Barroso reports: no falls since initial evaluation. Continues to reports increased difficulty/weakness with L lower extremity. Completing HEP on regular basis without much difficulty.       Objective   See Exercise, Manual, and Modality Logs for complete treatment.       Assessment:  Patient tolerates today's movement interventions well without adverse effects. Focus much of today's interventions in closed chain positions for improved muscle recruitment and functional strength. She demonstrates decreased standing balance and is a moderate fall risk. Balance training included rehearsal of ankle strategies and stepping strategy for reactive balance. Required several seated rest breaks throughout today's treatment session.      Plan:   Lower extremity strengthening, balance training      Timed:         Manual Therapy:         mins  27581;     Therapeutic Exercise:    18     mins  61114;     Neuromuscular Fidel:    14    mins  78170;    Therapeutic Activity:     2     mins  38501;     Gait Training:           mins  98742;     Ultrasound:          mins  26564;    Ionto                                   mins  83758  Self Care                            mins  32184  Traction          mins 86668      Un-Timed:  Canalith Repos         mins 17020  Electrical Stimulation:         mins  16623 (  );  Dry Needling          mins self-pay  Traction          mins 37972        Timed Treatment:   34   mins   Total Treatment:     34   mins    Fabián Otero PT  License Number: [place] [id number]# [id number]. [place] LIC# [id number]    Physical Therapist

## 2025-06-24 ENCOUNTER — TREATMENT (OUTPATIENT)
Dept: PHYSICAL THERAPY | Facility: CLINIC | Age: 85
End: 2025-06-24
Payer: MEDICARE

## 2025-06-24 DIAGNOSIS — R29.898 BILATERAL LEG WEAKNESS: Primary | ICD-10-CM

## 2025-06-24 DIAGNOSIS — Z91.81 HISTORY OF FALLING: ICD-10-CM

## 2025-06-24 DIAGNOSIS — R26.2 DIFFICULTY WALKING: ICD-10-CM

## 2025-06-24 DIAGNOSIS — R26.89 DECREASED FUNCTIONAL MOBILITY: ICD-10-CM

## 2025-06-24 PROCEDURE — 97530 THERAPEUTIC ACTIVITIES: CPT

## 2025-06-24 PROCEDURE — 97112 NEUROMUSCULAR REEDUCATION: CPT

## 2025-06-24 PROCEDURE — 97110 THERAPEUTIC EXERCISES: CPT

## 2025-06-24 NOTE — PROGRESS NOTES
Physical Therapy Daily Treatment Note  [facility] Physical Therapy  [place] - [address], [address]     [place] [address]   Phone: [phone]   Fax: [phone]      Patient: Mary Barroso   : 1940  Referring practitioner: RIP Morillo*  Date of Initial Visit: Type: THERAPY  Noted: 2025  Today's Date: 2025  Patient seen for 3 sessions       Visit Diagnoses:    ICD-10-CM ICD-9-CM   1. Bilateral leg weakness  R29.898 729.89   2. Difficulty walking  R26.2 719.7   3. Decreased functional mobility  R26.89 781.99   4. History of falling  Z91.81 V15.88         Subjective:  Mary Barroso reports: overall doing okay. No falls since last treatment session. Mass removed on L forearm 2 weeks ago with pink scar tissue present and no obvious signs of infection. No pain reported at the start of today's treatment session.       Objective   See Exercise, Manual, and Modality Logs for complete treatment.       Assessment:  Patient tolerates today's movement interventions well without adverse effects. Rehearsed transfer training from the floor to standing. Broke down task into fragments to include practicing rolling, prone to quadruped, quadruped to half-kneeling, and half kneeling to standing with use of UE support. Continued to rehearse standing balance to include ankle strategy and stepping reactions, and as well as practiced dynamic standing balance. Continues to progress well with physical therapy.       Plan:   Lower extremity strengthening, balance training      Timed:         Manual Therapy:         mins  29660;     Therapeutic Exercise:    16     mins  94193;     Neuromuscular Fidel:    14    mins  31599;    Therapeutic Activity:     18     mins  04453;     Gait Training:           mins  28531;     Ultrasound:          mins  66323;    Ionto                                   mins  85942  Self Care                            mins  92796  Traction          mins  95400      Un-Timed:  Canalith Repos         mins 40056  Electrical Stimulation:         mins  47388 ( );  Dry Needling          mins self-pay  Traction          mins 20166        Timed Treatment:   48   mins   Total Treatment:     48   mins    Fabián Otero PT  License Number: [place] [id number]# [id number]. [place] LIC# [id number]    Physical Therapist

## 2025-06-26 ENCOUNTER — TREATMENT (OUTPATIENT)
Dept: PHYSICAL THERAPY | Facility: CLINIC | Age: 85
End: 2025-06-26
Payer: MEDICARE

## 2025-06-26 DIAGNOSIS — R29.898 BILATERAL LEG WEAKNESS: Primary | ICD-10-CM

## 2025-06-26 DIAGNOSIS — Z91.81 HISTORY OF FALLING: ICD-10-CM

## 2025-06-26 DIAGNOSIS — R26.89 DECREASED FUNCTIONAL MOBILITY: ICD-10-CM

## 2025-06-26 NOTE — PROGRESS NOTES
Physical Therapy Daily Treatment Note  [facility] Physical Therapy  [place] - [address], [address]     [place] [id number]   Phone: [phone]   Fax: [phone]      Patient: Mary Barroso   : 1940  Referring practitioner: RIP Morillo*  Date of Initial Visit: Type: THERAPY  Noted: 2025  Today's Date: 2025  Patient seen for 4 sessions       Visit Diagnoses:    ICD-10-CM ICD-9-CM   1. Bilateral leg weakness  R29.898 729.89   2. Decreased functional mobility  R26.89 781.99   3. History of falling  Z91.81 V15.88         Subjective:  Mary Barroso reports:   Felt good after last session, no soreness.  Played nine holes yesterday, but had a lot of cramping in her legs while trying to sleep last night, may have lost more fluid with the heat during the day than she realized.      Objective   See Exercise, Manual, and Modality Logs for complete treatment.       Assessment:  Tolerated continued progression of therapeutic exercise/therapeutic activity/neuromuscular re-ed and balance well today, no increased pain reported during or after session.  Good recall of skills and transfer positions practiced at last session.   Continued with standing balance activity static and dynamic, ankle strategy and stepping reaction as noted.      Plan:   Lower extremity strengthening, balance training      Timed:         Manual Therapy:         mins  83785;     Therapeutic Exercise:         mins  46545;     Neuromuscular Fidel:   20    mins  15910;    Therapeutic Activity:     10     mins  75052;     Gait Training:           mins  45605;     Ultrasound:          mins  79860;    Ionto                                   mins  81899  Self Care                            mins  21504  Traction          mins 67467      Un-Timed:  Canalith Repos         mins 09237  Electrical Stimulation:         mins  89668 ( );  Dry Needling          mins self-pay  Traction          mins  46688        Timed Treatment:   30   mins   Total Treatment:     44   mins      Erik River PTA  [place] License #[id number]  Physical Therapist Assistant

## 2025-07-01 ENCOUNTER — TREATMENT (OUTPATIENT)
Dept: PHYSICAL THERAPY | Facility: CLINIC | Age: 85
End: 2025-07-01
Payer: MEDICARE

## 2025-07-01 DIAGNOSIS — Z91.81 HISTORY OF FALLING: ICD-10-CM

## 2025-07-01 DIAGNOSIS — R26.89 DECREASED FUNCTIONAL MOBILITY: ICD-10-CM

## 2025-07-01 DIAGNOSIS — R26.2 DIFFICULTY WALKING: ICD-10-CM

## 2025-07-01 DIAGNOSIS — R29.898 BILATERAL LEG WEAKNESS: Primary | ICD-10-CM

## 2025-07-01 NOTE — PROGRESS NOTES
Physical Therapy Daily Treatment Note  [facility] Physical Therapy  [place] - [address], [address]     [place] [address]   Phone: [phone]   Fax: [phone]      Patient: Mary Barroso   : 1940  Referring practitioner: RIP Morillo*  Date of Initial Visit: Type: THERAPY  Noted: 2025  Today's Date: 2025  Patient seen for 5 sessions       Visit Diagnoses:    ICD-10-CM ICD-9-CM   1. Bilateral leg weakness  R29.898 729.89   2. Decreased functional mobility  R26.89 781.99   3. History of falling  Z91.81 V15.88   4. Difficulty walking  R26.2 719.7         Subjective:  Mary Barroso reports:  Doing well overall today, no soreness form last session.  Does note an instance of dizziness while at grocery store with a friend, was holding onto cart and turned a corner, felt lightheaded.  Stood still holding to cart until this passed, however felt mild effects from that instance for a few hours afterward.      Objective   See Exercise, Manual, and Modality Logs for complete treatment.       Assessment:  Tolerated continued progression of therapeutic exercise/therapeutic activity/neuromuscular re-ed and balance well today, progressing balance tasks as noted, adding leg press as noted.  No increased pain reported during or after session.     Continued with standing balance activity static and dynamic, ankle strategy and stepping reaction as noted.      Plan:   Continue per PT POC, progress lower extremity strengthening, balance training as geoff      Timed:         Manual Therapy:         mins  05564;     Therapeutic Exercise:    5     mins  34272;     Neuromuscular Fidel:   15    mins  07470;    Therapeutic Activity:     10     mins  32288;     Gait Training:           mins  85418;     Ultrasound:          mins  04308;    Ionto                                   mins  39785  Self Care                            mins  15673  Traction          mins  74474      Un-Timed:  Canalith Repos         mins 35546  Electrical Stimulation:         mins  92623 ( );  Dry Needling          mins self-pay  Traction          mins 95809        Timed Treatment:   30   mins   Total Treatment:     45   mins      MAE Wheeler License #[id number]  Physical Therapist Assistant

## 2025-07-03 ENCOUNTER — TREATMENT (OUTPATIENT)
Dept: PHYSICAL THERAPY | Facility: CLINIC | Age: 85
End: 2025-07-03
Payer: MEDICARE

## 2025-07-03 DIAGNOSIS — R26.89 DECREASED FUNCTIONAL MOBILITY: ICD-10-CM

## 2025-07-03 DIAGNOSIS — R29.898 BILATERAL LEG WEAKNESS: Primary | ICD-10-CM

## 2025-07-03 DIAGNOSIS — Z91.81 HISTORY OF FALLING: ICD-10-CM

## 2025-07-03 DIAGNOSIS — R26.2 DIFFICULTY WALKING: ICD-10-CM

## 2025-07-03 NOTE — PROGRESS NOTES
"  Physical Therapy Re Cert[facility] Of [facility] of [facility] - [address], [address] [place] [address]   Phone: [phone] Fax: [phone]   Patient: Mary Barroso   : 1940  Diagnosis/ICD-10 Code:  Bilateral leg weakness [R29.898]  Referring practitioner: Val Ivan AP*  Date of Initial Visit: Type: THERAPY  Noted: 2025  Today's Date: 7/3/2025  Patient seen for 6 sessions         Visit Diagnoses:    ICD-10-CM ICD-9-CM   1. Bilateral leg weakness  R29.898 729.89   2. Decreased functional mobility  R26.89 781.99   3. History of falling  Z91.81 V15.88   4. Difficulty walking  R26.2 719.7         Mary Barroso reports: about a 33% improvement in overall symptoms. \"My friend told me that I've been walking a bit better. She reports no falls since the start of physical therapy. \"My balance feel a bit better since starting physical therapy.\" She reports no pain at the moment.   Subjective Questionnaire: ABC: 71%  Clinical Progress: improved  Home Program Compliance: Yes  Treatment has included: therapeutic exercise, neuromuscular re-education, manual therapy, therapeutic activity, and gait training      Objective      Strength/Myotome Testing   Left Hip   Planes of Motion   Flexion: 4+  Extension: 4+  Abduction: 4  Adduction: 4+     Right Hip   Planes of Motion   Flexion: 4+  Extension: 4+  Abduction: 4  Adduction: 4+     Left Knee   Flexion: 4+  Extension: 4+     Right Knee   Flexion: 4+  Extension: 4+     Left Ankle/Foot   Dorsiflexion: 4+  Plantar flexion: 4+     Right Ankle/Foot   Dorsiflexion: 4+  Plantar flexion: 4+      Functional mobility:  Maki balance scale: 46/56  30 second sit to stand: 14 reps without UE support     Balance:  Romberg stance on firm surface: 30+ seconds  Romberg stance on airex eyes open: 30+ seconds  Romberg stance on airex eyes closed: 20.2 seconds with significant truncal sway  Tandem stance on airex: 18.2 seconds with R leading, 17.4 seconds " with L leading  SLS on airex: able to lift 1 foot for a brief moment, but unable to maintain the position.    Assessment/Plan  Patient is an 84 y/o female who presents to physical therapy for her 6th physical therapy visit for balance deficits with a history of falling. She demonstrates a half grade improvement in lower extremity strength, a 10 point improvement on the Segura balance scale, 4 reps improvement in 30 second sit to stand, and demonstrates improvements in static balance on firm and dynamic surfaces. She has met all short term goals, and is progressing well towards remaining long term goals. She would continue to benefit from skilled physical therapy to solidify HEP and continue to improve standing balance to reduce the risk of injurious fall.     Plan Goals: SHORT TERM GOALS:   5 weeks  1. Pt will be compliant with HEP. - MET  2. Pt to improve 30 sec sit to stand to 11 reps without UE support, demonstrating improvements in strength, endurance, and balance with transitional movements. - MET  3. Pt will demonstrate 4+/5 B LE strength in order to improve gait, transfers and stair climbing ability. - Progressing  4. Pt to improve SLS to 1+ seconds, demonstrating improvements in standing balance. - MET  5. Pt to demonstrate appropriate use of assistive device, and ambulate x500ft without overt loss of balance. - MET                            LONG TERM GOALS:  10 weeks  1.Pt will be able to descend two 6in steps without use of UE in order to more safely navigate the community.  - Progressing  2. Pt will be able to climb 16 steps in reciprocal pattern with andreia UE due to improved strength, in order to safely enter/exit her home. - Progressing  3. Pt will be able to perform 12 sit-stand without use of UE due to improved strength. - MET  4. Pt will be able to stand on foam for 15+ sec or greater with eyes closed due to improved balance. - MET  5. Pt to score at least 45/56 on SEGURA assessment.  -  MET     Recommendations: Continue with recommendations 2x  Timeframe: 6 weeks  Prognosis to achieve goals: good      Timed:         Manual Therapy:         mins  63405;     Therapeutic Exercise:    25     mins  35419;     Neuromuscular Fidel:    15    mins  27545;    Therapeutic Activity:          mins  95789;     Gait Training:           mins  99456;     Ultrasound:          mins  05847;    Ionto                                   mins   15780  Self Care                            mins   08328    Un-Timed:  Electrical Stimulation:         mins  24914 ( );  Dry Needling          mins self-pay  Traction          mins 87261  Re-Eval                               mins  05096  Canalith Repos         mins 60913    Timed Treatment:   40   mins   Total Treatment:     40   mins          PT: Fabián Otero PT     License Number: [place] [id number]# [id number]. [place] LIC# [id number]  Electronically signed by Fabián Otero PT, 07/03/25, 11:24 AM EDT    Certification Period: 7/3/2025 thru 9/30/2025  I certify that the therapy services are furnished while this patient is under my care.  The services outlined above are required by this patient, and will be reviewed every 90 days.         Physician Signature:__________________________________________________    PHYSICIAN: Val Ivan APRN  NPI: [id number]                                      DATE:  :     Please sign and return via fax to [phone] . Thank you, [facility] Physical Therapy

## 2025-07-08 ENCOUNTER — TREATMENT (OUTPATIENT)
Dept: PHYSICAL THERAPY | Facility: CLINIC | Age: 85
End: 2025-07-08
Payer: MEDICARE

## 2025-07-08 DIAGNOSIS — R26.2 DIFFICULTY WALKING: ICD-10-CM

## 2025-07-08 DIAGNOSIS — R26.89 DECREASED FUNCTIONAL MOBILITY: ICD-10-CM

## 2025-07-08 DIAGNOSIS — Z91.81 HISTORY OF FALLING: ICD-10-CM

## 2025-07-08 DIAGNOSIS — R29.898 BILATERAL LEG WEAKNESS: Primary | ICD-10-CM

## 2025-07-08 NOTE — PROGRESS NOTES
Physical Therapy Daily Treatment Note  [facility] Physical Therapy  [place] - [address], [address]     [place] [address]   Phone: [phone]   Fax: [phone]      Patient: Mary Barroso   : 1940  Referring practitioner: RIP Morillo*  Date of Initial Visit: Type: THERAPY  Noted: 2025  Today's Date: 2025  Patient seen for 7 sessions       Visit Diagnoses:    ICD-10-CM ICD-9-CM   1. Bilateral leg weakness  R29.898 729.89   2. Decreased functional mobility  R26.89 781.99   3. History of falling  Z91.81 V15.88   4. Difficulty walking  R26.2 719.7         Subjective:  Mary Barroso reports: no falls or close calls since the last treatment session. No complaints of pain at the start of today's treatment session. Overall doing okay.       Objective   See Exercise, Manual, and Modality Logs for complete treatment.       Assessment:  Patient tolerates today's movement interventions well without adverse effects. Interventions continued to focus at improving standing balance and reducing risk of falling. Continued to focus at reactive balance strategies with ankle reactions and stepping reactions. She demonstrates behaviors that make her a mild fall risk, but demonstrates improvements in awareness since IE. Good motivation demonstrated today.       Plan:   progress lower extremity strengthening, balance training as geoff        Timed:         Manual Therapy:         mins  99029;     Therapeutic Exercise:    16     mins  87809;     Neuromuscular Fidel:    14    mins  81102;    Therapeutic Activity:     2     mins  58489;     Gait Training:           mins  93629;     Ultrasound:          mins  25653;    Ionto                                   mins  62951  Self Care                            mins  54933  Traction          mins 03806      Un-Timed:  Canalith Repos         mins 99766  Electrical Stimulation:         mins  43917 ( );  Dry Needling          mins  self-pay  Traction          mins 37719        Timed Treatment:   32   mins   Total Treatment:     48   mins    Fabián Otero PT  License Number: [place] [id number]# [id number]. [place] LIC# [id number]    Physical Therapist

## 2025-07-10 ENCOUNTER — TREATMENT (OUTPATIENT)
Dept: PHYSICAL THERAPY | Facility: CLINIC | Age: 85
End: 2025-07-10
Payer: MEDICARE

## 2025-07-10 DIAGNOSIS — R26.89 DECREASED FUNCTIONAL MOBILITY: ICD-10-CM

## 2025-07-10 DIAGNOSIS — R29.898 BILATERAL LEG WEAKNESS: Primary | ICD-10-CM

## 2025-07-10 NOTE — PROGRESS NOTES
Physical Therapy Daily Treatment Note  [facility] Physical Therapy  [place] - [address], [address]     [place] [address]   Phone: [phone]   Fax: [phone]      Patient: Mary Barroso   : 1940  Referring practitioner: RIP Morillo*  Date of Initial Visit: Type: THERAPY  Noted: 2025  Today's Date: 7/10/2025  Patient seen for 8 sessions       Visit Diagnoses:    ICD-10-CM ICD-9-CM   1. Bilateral leg weakness  R29.898 729.89   2. Decreased functional mobility  R26.89 781.99         Subjective:  Mary Barroso reports: mild amounts of lower extremity soreness following the last treatment session. No falls since the start of physical therapy. Was able to play a bit of golf yesterday and is feeling a bit more confident with her balance.       Objective   See Exercise, Manual, and Modality Logs for complete treatment.       Assessment:  Patient tolerates today's movement interventions well without adverse effects. Continue to focus interventions into balance training with ankle strategy and stepping reaction training. Additionally, provided interventions to improve functional strength into bilateral lower extremities with step ups, sit to stands, and mini-squats with increased fatigue reported. Good response to treatment thus far, and will likely d/c by end of POC, barring any significant setbacks.       Plan:   Plan for d/c      Timed:         Manual Therapy:         mins  51437;     Therapeutic Exercise:    5     mins  89121;     Neuromuscular Fidel:    15    mins  95624;    Therapeutic Activity:     12     mins  14223;     Gait Training:           mins  17468;     Ultrasound:          mins  13694;    Ionto                                   mins  22228  Self Care                            mins  68956  Traction          mins 35660      Un-Timed:  Canalith Repos         mins 63210  Electrical Stimulation:         mins  23961 ( );  Dry Needling          mins  self-pay  Traction          mins 76651        Timed Treatment:   32   mins   Total Treatment:     48   mins    Fabián Otero PT  License Number: [place] [id number]# [id number]. [place] LIC# [id number]    Physical Therapist

## 2025-07-17 ENCOUNTER — TREATMENT (OUTPATIENT)
Dept: PHYSICAL THERAPY | Facility: CLINIC | Age: 85
End: 2025-07-17
Payer: MEDICARE

## 2025-07-17 DIAGNOSIS — R26.89 DECREASED FUNCTIONAL MOBILITY: ICD-10-CM

## 2025-07-17 DIAGNOSIS — Z91.81 HISTORY OF FALLING: ICD-10-CM

## 2025-07-17 DIAGNOSIS — R26.2 DIFFICULTY WALKING: ICD-10-CM

## 2025-07-17 DIAGNOSIS — R29.898 BILATERAL LEG WEAKNESS: Primary | ICD-10-CM

## 2025-07-17 NOTE — PROGRESS NOTES
"Physical Therapy Progress Note  [facility] Physical Therapy  [place] - [address], [address] [place] [address]   Phone: [phone] Fax: [phone]       Patient: Mary Barroso   : 1940  Referring practitioner: RIP Morillo*  Date of Initial Visit: Type: THERAPY  Noted: 2025  Today's Date: 2025  Patient seen for 9 sessions       Visit Diagnoses:    ICD-10-CM ICD-9-CM   1. Bilateral leg weakness  R29.898 729.89   2. Decreased functional mobility  R26.89 781.99   3. History of falling  Z91.81 V15.88   4. Difficulty walking  R26.2 719.7         Subjective:  Mary Barroso reports: no falls since the start of physical therapy. \"My balance feels a bit better. I feel more confident about walking.\" Feels ready to work on balance on her own.      Subjective Questionnaire: LEFS: 62/80  Clinical Progress: improved  Home Program Compliance: Yes  Treatment has included: therapeutic exercise, neuromuscular re-education, manual therapy, therapeutic activity, and gait training        Objective      Strength/Myotome Testing   Left Hip   Planes of Motion   Flexion: 4+  Extension: 4+  Abduction: 4+  Adduction: 4+     Right Hip   Planes of Motion   Flexion: 4+  Extension: 4+  Abduction: 4+  Adduction: 4+     Left Knee   Flexion: 4+  Extension: 4+     Right Knee   Flexion: 4+  Extension: 4+     Left Ankle/Foot   Dorsiflexion: 4+  Plantar flexion: 4+     Right Ankle/Foot   Dorsiflexion: 4+  Plantar flexion: 4+      Functional mobility:  Maki balance scale: 51/56  30 second sit to stand: 14 reps without UE support     Balance:  Romberg stance on firm surface: 30+ seconds  Romberg stance on airex eyes open: 30+ seconds  Romberg stance on airex eyes closed: 22.4 seconds with significant truncal sway  Tandem stance on airex: 18.2 seconds with R leading, 25.2 seconds with L leading  SLS on airex: R: 2.6 seconds, L 2.5 seconds    Ambulation:  Currently ambulates on level surfaces " without the need of an assistive device. Decreased trunk extension, decreased step length/height. No overt loss of balance on level surfaces.     Assessment:  Patient is an 84 y/o female who presents to her 9th physical therapy visit for balance deficits with history of falling. She demonstrates an excellent improvement on the Maki balance, and gradual improvements in her static standing balance. She demonstrates improvements in her ability to complete righting reactions to include stepping and ankle strategy. She reports improvements on the LEFS. She continues to be a mild to moderate fall risk, but feels more confident in her balance, and reports no falls since the start of physical therapy. Discussed safety precautions to include use of personal cellular device, use of handrails, and use of SPC as needed for balance.     Plan Goals: SHORT TERM GOALS:   5 weeks  1. Pt will be compliant with HEP. - MET  2. Pt to improve 30 sec sit to stand to 11 reps without UE support, demonstrating improvements in strength, endurance, and balance with transitional movements. - MET  3. Pt will demonstrate 4+/5 B LE strength in order to improve gait, transfers and stair climbing ability. - MET  4. Pt to improve SLS to 1+ seconds, demonstrating improvements in standing balance. - MET  5. Pt to demonstrate appropriate use of assistive device, and ambulate x500ft without overt loss of balance. - MET                            LONG TERM GOALS:  10 weeks  1.Pt will be able to descend two 6in steps without use of UE in order to more safely navigate the community.  - Progressing  2. Pt will be able to climb 16 steps in reciprocal pattern with andreia UE due to improved strength, in order to safely enter/exit her home. - MET  3. Pt will be able to perform 12 sit-stand without use of UE due to improved strength. - MET  4. Pt will be able to stand on foam for 15+ sec or greater with eyes closed due to improved balance. - MET  5. Pt to score at  least 45/56 on SEGURA assessment.  - MET    Plan:   D/c at this time      Timed:         Manual Therapy:         mins  92083;     Therapeutic Exercise:    18     mins  05540;     Neuromuscular Fidel:    10    mins  31385;    Therapeutic Activity:     26     mins  83209;     Gait Training:           mins  33798;     Ultrasound:          mins  74241;    Ionto                                   mins  55548  Self Care                            mins  22060  Traction          mins 60647      Un-Timed:  Canalith Repos         mins 34836  Electrical Stimulation:         mins  62420 ( );  Dry Needling          mins self-pay  Traction          mins 08989        Timed Treatment:   54   mins   Total Treatment:     54   mins    Fabián Otero PT  License Number: [place] [id number]# [id number]. [place] LIC# [id number]    Physical Therapist

## 2025-07-19 ENCOUNTER — TRANSCRIBE ORDERS (OUTPATIENT)
Dept: ADMINISTRATIVE | Facility: HOSPITAL | Age: 85
End: 2025-07-19
Payer: MEDICARE

## 2025-07-19 DIAGNOSIS — R91.1 PULMONARY NODULE: Primary | ICD-10-CM

## (undated) DEVICE — DEV INFL ALLIANCE2 SYS

## (undated) DEVICE — ESOPHAGEAL BALLOON DILATATION CATHETER: Brand: CRE FIXED WIRE

## (undated) DEVICE — BLCK/BITE BLOX W/DENTL/RIM W/STRAP 54F

## (undated) DEVICE — ADAPT CLN SCPE ENDO PORPOISE BX/50 DISP

## (undated) DEVICE — KT ORCA ORCAPOD DISP STRL

## (undated) DEVICE — Device

## (undated) DEVICE — GOWN ,SIRUS,NONREINFORCED 3XL: Brand: MEDLINE

## (undated) DEVICE — VIAL FORMLN CAP 10PCT 20ML

## (undated) DEVICE — SINGLE-USE BIOPSY FORCEPS: Brand: RADIAL JAW 4

## (undated) DEVICE — MSK ENDO PORT O2 POM ELITE CURAPLEX A/